# Patient Record
Sex: FEMALE | Race: WHITE | NOT HISPANIC OR LATINO
[De-identification: names, ages, dates, MRNs, and addresses within clinical notes are randomized per-mention and may not be internally consistent; named-entity substitution may affect disease eponyms.]

---

## 2017-12-01 ENCOUNTER — FORM ENCOUNTER (OUTPATIENT)
Age: 57
End: 2017-12-01

## 2017-12-02 ENCOUNTER — OUTPATIENT (OUTPATIENT)
Dept: OUTPATIENT SERVICES | Facility: HOSPITAL | Age: 57
LOS: 1 days | End: 2017-12-02
Payer: SUBSIDIZED

## 2017-12-02 ENCOUNTER — APPOINTMENT (OUTPATIENT)
Dept: CARDIOLOGY | Facility: CLINIC | Age: 57
End: 2017-12-02

## 2017-12-02 ENCOUNTER — APPOINTMENT (OUTPATIENT)
Dept: CV DIAGNOSITCS | Facility: HOSPITAL | Age: 57
End: 2017-12-02

## 2017-12-02 DIAGNOSIS — Z00.6 ENCOUNTER FOR EXAMINATION FOR NORMAL COMPARISON AND CONTROL IN CLINICAL RESEARCH PROGRAM: ICD-10-CM

## 2017-12-02 PROCEDURE — 75571 CT HRT W/O DYE W/CA TEST: CPT | Mod: 26

## 2017-12-02 PROCEDURE — 75571 CT HRT W/O DYE W/CA TEST: CPT

## 2017-12-02 PROCEDURE — 93010 ELECTROCARDIOGRAM REPORT: CPT

## 2017-12-02 PROCEDURE — 93306 TTE W/DOPPLER COMPLETE: CPT | Mod: 26

## 2017-12-02 PROCEDURE — 93306 TTE W/DOPPLER COMPLETE: CPT

## 2017-12-02 PROCEDURE — 93005 ELECTROCARDIOGRAM TRACING: CPT

## 2017-12-07 ENCOUNTER — RESULT CHARGE (OUTPATIENT)
Age: 57
End: 2017-12-07

## 2017-12-08 ENCOUNTER — OTHER (OUTPATIENT)
Age: 57
End: 2017-12-08

## 2017-12-08 DIAGNOSIS — Z00.00 ENCOUNTER FOR GENERAL ADULT MEDICAL EXAMINATION W/OUT ABNORMAL FINDINGS: ICD-10-CM

## 2018-09-09 ENCOUNTER — EMERGENCY (EMERGENCY)
Facility: HOSPITAL | Age: 58
LOS: 1 days | Discharge: HOME | End: 2018-09-09
Attending: EMERGENCY MEDICINE
Payer: COMMERCIAL

## 2018-09-09 VITALS
SYSTOLIC BLOOD PRESSURE: 124 MMHG | DIASTOLIC BLOOD PRESSURE: 65 MMHG | OXYGEN SATURATION: 98 % | TEMPERATURE: 98 F | HEART RATE: 65 BPM | RESPIRATION RATE: 18 BRPM

## 2018-09-09 VITALS
DIASTOLIC BLOOD PRESSURE: 72 MMHG | TEMPERATURE: 98 F | RESPIRATION RATE: 16 BRPM | SYSTOLIC BLOOD PRESSURE: 146 MMHG | OXYGEN SATURATION: 99 % | HEART RATE: 66 BPM

## 2018-09-09 PROCEDURE — 99284 EMERGENCY DEPT VISIT MOD MDM: CPT

## 2018-09-09 PROCEDURE — 72125 CT NECK SPINE W/O DYE: CPT

## 2018-09-09 PROCEDURE — 72125 CT NECK SPINE W/O DYE: CPT | Mod: 26

## 2018-09-09 PROCEDURE — 99284 EMERGENCY DEPT VISIT MOD MDM: CPT | Mod: 25

## 2018-09-09 PROCEDURE — 70450 CT HEAD/BRAIN W/O DYE: CPT

## 2018-09-09 PROCEDURE — 70450 CT HEAD/BRAIN W/O DYE: CPT | Mod: 26

## 2018-09-09 RX ORDER — IBUPROFEN 200 MG
600 TABLET ORAL ONCE
Qty: 0 | Refills: 0 | Status: COMPLETED | OUTPATIENT
Start: 2018-09-09 | End: 2018-09-09

## 2018-09-09 RX ORDER — ACETAMINOPHEN 500 MG
975 TABLET ORAL ONCE
Qty: 0 | Refills: 0 | Status: COMPLETED | OUTPATIENT
Start: 2018-09-09 | End: 2018-09-09

## 2018-09-09 RX ADMIN — Medication 600 MILLIGRAM(S): at 21:12

## 2018-09-09 RX ADMIN — Medication 975 MILLIGRAM(S): at 19:53

## 2018-09-09 NOTE — ED PROVIDER NOTE - MUSCULOSKELETAL, MLM
Spine appears normal, range of motion is not limited, no muscle or joint tenderness **ATTENDING ADDENDUM (Dr. Jorge Fields): POSITIVE left upper extremity with known and chronic lymphedema from previous breast cancer-associated surgical procedures.

## 2018-09-09 NOTE — ED PROVIDER NOTE - PROGRESS NOTE DETAILS
**ATTENDING ADDENDUM (Dr. Jorge Fields): patient serially evaluated throughout ED course. NO acute deterioration up to this time in the ED. ED diagnostics up to this time acknowledged, reviewed and noted. Awaiting readings of CT images. Reevaluated. Still with modest paraspinal neck pain, occipital headache, and shoulder stiffness. NO evidence of fracture or disolocation. Agree with NSAIDs (confirmed appropriateness of this medication with patient prior to administration). Will continue to observe and monitor closely. C- collar cleared, no cervical mid tender, full ROMs, NAD in CT. Neuro- intact. C- collar cleared, no cervical mid tender, full ROMs, NAD in CT. Neuro- intact.  **ATTENDING ADDENDUM (Dr. Jorge Fields): patient serially evaluated throughout ED course. NO acute deterioration up to this time in the ED. ED diagnostics up to this time acknowledged, reviewed and noted. NO evidence of worrisome cervical spine or cord injury, intracerebral hemorrhage, or equivalent traumatic injuries warranting admission and inpatient observation. Agree with discharge home with close outpatient followup with primary care physician/provider and with medications (if appropriate, if clinically indicated, and as prescribed, as noted in EMR). Anticipatory guidance provided.

## 2018-09-09 NOTE — ED PROVIDER NOTE - GASTROINTESTINAL NEGATIVE STATEMENT, MLM
no abdominal pain, no bloating, no constipation, no diarrhea, no nausea and no vomiting. no abdominal pain, no bloating, no constipation, no diarrhea, no nausea and no vomiting. **ATTENDING ADDENDUM (Dr. Jorge Fields): history of gastric bypass surgery
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
## 2018-09-09 NOTE — ED PROVIDER NOTE - ATTENDING CONTRIBUTION TO CARE
**ATTENDING ADDENDUM (Dr. Jorge Fields): I attest that I have directly examined this patient and reviewed and formulated the diagnostic and therapeutic management plan in collaboration with the advanced practitioner (NP, PA). Please see MDM note and remainder of EMR for findings from CC, HPI, ROS, and PE. (Filiberto) **ATTENDING ADDENDUM (Dr. Jorge Fields): I attest that I have directly examined this patient and reviewed and formulated the diagnostic and therapeutic management plan in collaboration with the advanced practitioner (NP, PA). Please see MDM note and remainder of EMR for findings from CC, HPI, ROS, and PE. (Maggie/Filiberto)

## 2018-09-09 NOTE — ED PROVIDER NOTE - EYES, MLM
Clear bilaterally, pupils equal, round and reactive to light. **ATTENDING ADDENDUM (Dr. Jorge Fields): Extraocular muscle movements intact. Clear corneas bilaterally, pupils equal and round. NO nystagmus.
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
## 2018-09-09 NOTE — ED PROVIDER NOTE - MUSCULOSKELETAL [+], MLM
NECK PAIN NECK PAIN/**ATTENDING ADDENDUM (Dr. Jorge Fields): chronic lymphedema of the left upper extremity
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
## 2018-09-09 NOTE — ED PROVIDER NOTE - GASTROINTESTINAL, MLM
Abdomen soft, non-tender, no guarding. Abdomen soft, non-tender **ATTENDING ADDENDUM (Dr. Jorge Fields): non-distended. NO guarding, rebound, or rigidity. NO pulsatile or non-pulsatile masses. NO hernias. NO obvious hepatosplenomegaly.

## 2018-09-09 NOTE — ED PROVIDER NOTE - CARE PLAN
Goal:	ATTENDING ADDENDUM (Dr. Jorge Fields): Goals of care include resolution of emergent/urgent symptoms and concerns, and restoration to baseline level of homeostasis. Principal Discharge DX:	Cervical strain, acute, initial encounter  Goal:	ATTENDING ADDENDUM (Dr. Jorge Fields): Goals of care include resolution of emergent/urgent symptoms and concerns, and restoration to baseline level of homeostasis.  Secondary Diagnosis:	Head injury Principal Discharge DX:	Cervical strain, acute, initial encounter  Goal:	ATTENDING ADDENDUM (Dr. Jorge Fields): Goals of care include resolution of emergent/urgent symptoms and concerns, and restoration to baseline level of homeostasis.  Assessment and plan of treatment:	ATTENDING ADDENDUM (Dr. Jorge Fields): (1) anticipatory guidance provided  (2) rest  (3) outpatient follow-up with your primary care physician/provider (4) return if symptoms worsen, persist, or do not resolve (5) medications, if indicated, as prescribed  Secondary Diagnosis:	Head injury

## 2018-09-09 NOTE — ED ADULT NURSE NOTE - NSIMPLEMENTINTERV_GEN_ALL_ED
Implemented All Universal Safety Interventions:  [place] to call system. Call bell, personal items and telephone within reach. Instruct patient to call for assistance. Room bathroom lighting operational. Non-slip footwear when patient is off stretcher. Physically safe environment: no spills, clutter or unnecessary equipment. Stretcher in lowest position, wheels locked, appropriate side rails in place.

## 2018-09-09 NOTE — ED PROVIDER NOTE - PSH
Breast Augmentation    H/O: Hysterectomy    History of Abdominoplasty    History of Bariatric Surgery    S/P Bilateral Mastectomy    S/P Cholecystectomy    S/P Tonsillectomy

## 2018-09-09 NOTE — ED PROVIDER NOTE - ENMT, MLM
Airway patent, Nasal mucosa clear. Mouth with normal mucosa. Throat has no vesicles, no oropharyngeal exudates and uvula is midline. **ATTENDING ADDENDUM (Dr. Jorge Fields): AIRWAY CLEAR. NO pooling of secretions, POSITIVE gag reflex, NO debris, ABLE TO SELF-PROTECT AIRWAY.

## 2018-09-09 NOTE — ED PROVIDER NOTE - PLAN OF CARE
ATTENDING ADDENDUM (Dr. Jorge Fields): Goals of care include resolution of emergent/urgent symptoms and concerns, and restoration to baseline level of homeostasis. ATTENDING ADDENDUM (Dr. Jorge Fields): (1) anticipatory guidance provided  (2) rest  (3) outpatient follow-up with your primary care physician/provider (4) return if symptoms worsen, persist, or do not resolve (5) medications, if indicated, as prescribed

## 2018-09-09 NOTE — ED PROVIDER NOTE - SKIN NEGATIVE STATEMENT, MLM
no abrasions, no jaundice, no lesions, no pruritis, and no rashes. no abrasions, no jaundice, no lesions, no pruritis, and no rashes. **ATTENDING ADDENDUM (Dr. Jorge Fields): POSITIVE history of breast cancer s/p bilateral mastectomy and other related procedures.

## 2018-09-09 NOTE — ED PROVIDER NOTE - NEUROLOGICAL, MLM
Alert and oriented, no focal deficits, no motor or sensory deficits. **ATTENDING ADDENDUM (Dr. Jorge Fields): Mamadou coma score = 15 (eyes =4, verbal =5, motor =6). NO posturing NO focal motor weakness NO sensory changes. Awake alert coherent interactive and oriented to person, place, and time.
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
## 2018-10-10 ENCOUNTER — APPOINTMENT (OUTPATIENT)
Dept: OTOLARYNGOLOGY | Facility: CLINIC | Age: 58
End: 2018-10-10
Payer: COMMERCIAL

## 2018-10-10 VITALS
WEIGHT: 242 LBS | BODY MASS INDEX: 44.53 KG/M2 | HEIGHT: 62 IN | HEART RATE: 68 BPM | DIASTOLIC BLOOD PRESSURE: 71 MMHG | SYSTOLIC BLOOD PRESSURE: 126 MMHG

## 2018-10-10 DIAGNOSIS — I89.0 LYMPHEDEMA, NOT ELSEWHERE CLASSIFIED: ICD-10-CM

## 2018-10-10 PROCEDURE — 99204 OFFICE O/P NEW MOD 45 MIN: CPT | Mod: 25

## 2018-10-10 PROCEDURE — 92567 TYMPANOMETRY: CPT

## 2018-10-10 PROCEDURE — G0268 REMOVAL OF IMPACTED WAX MD: CPT

## 2018-10-10 PROCEDURE — 92557 COMPREHENSIVE HEARING TEST: CPT

## 2018-10-10 RX ORDER — METFORMIN HYDROCHLORIDE 1000 MG/1
1000 TABLET, COATED ORAL
Refills: 0 | Status: ACTIVE | COMMUNITY

## 2018-10-10 RX ORDER — SITAGLIPTIN 100 MG/1
100 TABLET, FILM COATED ORAL
Refills: 0 | Status: ACTIVE | COMMUNITY

## 2018-12-11 NOTE — ED ADULT NURSE NOTE - PSH
done
Breast Augmentation    H/O: Hysterectomy    History of Abdominoplasty    History of Bariatric Surgery    S/P Bilateral Mastectomy    S/P Cholecystectomy    S/P Tonsillectomy

## 2018-12-12 ENCOUNTER — APPOINTMENT (OUTPATIENT)
Dept: OTOLARYNGOLOGY | Facility: CLINIC | Age: 58
End: 2018-12-12
Payer: COMMERCIAL

## 2018-12-12 VITALS
WEIGHT: 236 LBS | DIASTOLIC BLOOD PRESSURE: 79 MMHG | HEIGHT: 62 IN | BODY MASS INDEX: 43.43 KG/M2 | HEART RATE: 65 BPM | SYSTOLIC BLOOD PRESSURE: 128 MMHG

## 2018-12-12 DIAGNOSIS — H60.391 OTHER INFECTIVE OTITIS EXTERNA, RIGHT EAR: ICD-10-CM

## 2018-12-12 PROCEDURE — 99214 OFFICE O/P EST MOD 30 MIN: CPT

## 2020-06-29 ENCOUNTER — APPOINTMENT (OUTPATIENT)
Dept: OTOLARYNGOLOGY | Facility: CLINIC | Age: 60
End: 2020-06-29
Payer: COMMERCIAL

## 2020-06-29 VITALS
WEIGHT: 243 LBS | HEIGHT: 62 IN | HEART RATE: 66 BPM | SYSTOLIC BLOOD PRESSURE: 179 MMHG | TEMPERATURE: 98.2 F | DIASTOLIC BLOOD PRESSURE: 82 MMHG | BODY MASS INDEX: 44.72 KG/M2

## 2020-06-29 DIAGNOSIS — H61.23 IMPACTED CERUMEN, BILATERAL: ICD-10-CM

## 2020-06-29 DIAGNOSIS — H93.13 TINNITUS, BILATERAL: ICD-10-CM

## 2020-06-29 DIAGNOSIS — H90.3 SENSORINEURAL HEARING LOSS, BILATERAL: ICD-10-CM

## 2020-06-29 PROCEDURE — G0268 REMOVAL OF IMPACTED WAX MD: CPT

## 2020-06-29 PROCEDURE — 92557 COMPREHENSIVE HEARING TEST: CPT

## 2020-06-29 PROCEDURE — 99214 OFFICE O/P EST MOD 30 MIN: CPT | Mod: 25

## 2020-06-29 PROCEDURE — 92567 TYMPANOMETRY: CPT

## 2020-06-29 NOTE — HISTORY OF PRESENT ILLNESS
[Tinnitus] : tinnitus [Hearing Loss] : hearing loss [de-identified] : 61 yo female\par Patient presents for routine annual visit. Complains of tinnitus L>R ear. Describes constant  as a high pitched sound then to a low tone. Has a hearing loss, no change since last visit. \par Pt has no ear pain, ear drainage, vertigo, nasal congestion, nasal discharge, epistaxis, sinus infections, facial pain, facial pressure, throat pain, dysphagia or fevers\par \par  [Anxiety] : no anxiety [Dizziness] : no dizziness [Recurrent Otitis Media] : no recurrent otitis media [Headache] : no headache [Otitis Media with Effusion] : no otitis media with effusion [Presbycusis] : no presbycusis [Congenital Ear Malformation] : no congenital ear malformation [Meniere Disease] : no Meniere disease [Otosclerosis] : no otosclerosis [Perilymphatic Fistula] : no perilymphatic fistula [Hypertension] : no hypertension [Smoking] : no smoking [Loud Noise Exposure] : no history of loud noise exposure [Early Onset Hearing Loss] : no early onset hearing loss [Stroke] : no stroke [Facial Pressure] : no facial pressure [Facial Pain] : no facial pain [Nasal Congestion] : no nasal congestion [Diplopia] : no diplopia [Allergic Rhinitis] : no allergic rhinitis [Ear Fullness] : no ear fullness [Maxillary Tooth Infection] : no maxillary tooth infection [Environmental Allergies] : no environmental allergies [Septal Deviation] : no septal deviation [Seasonal Allergies] : no seasonal allergies [Environmental Allergens] : no environmental allergens [Nasal FB Removal] : no nasal foreign body removal [Adenoidectomy] : no adenoidectomy [Allergies] : no allergies [Asthma] : no asthma [Neck Mass] : no neck mass [Chills] : no chills [Neck Pain] : no neck pain [Fatigue] : no fatigue [Cough] : no cough [Cold Intolerance] : no cold intolerance [Heat Intolerance] : no heat intolerance [Hyperthyroidism] : no hyperthyroidism [Hodgkin Disease] : no hodgkin disease [Non-Hodgkin Lymphoma] : no non-hodgkin lymphoma [Sialadenitis] : no sialadenitis [Alcohol Use] : no alcohol use [Tobacco Use] : no tobacco use [Graves Disease] : no graves disease [Thyroid Cancer] : no thyroid cancer

## 2020-06-29 NOTE — PHYSICAL EXAM
[Midline] : trachea located in midline position [Normal] : no rashes [de-identified] : left ear cerumen

## 2020-06-29 NOTE — ASSESSMENT
[FreeTextEntry1] : Wax:\par -Cerumen is removed from the left  ear canal with a curette and suction.\par -Rx:Debrox be placed in both ears on a routine basis to keep them free of wax.\par -Routine debridement suggested to keep the ears free of wax.\par \par Tinnitus \par -Hearing Test performed to evaluate the extent of hearing loss and to explain pt's symptoms-SNHL bilat \par bilateral sensorineural hearing loss-cleared for hearing aids\par \par f/u annual\par \par

## 2020-07-27 ENCOUNTER — OUTPATIENT (OUTPATIENT)
Dept: OUTPATIENT SERVICES | Facility: HOSPITAL | Age: 60
LOS: 1 days | End: 2020-07-27
Payer: COMMERCIAL

## 2020-07-27 ENCOUNTER — RESULT REVIEW (OUTPATIENT)
Age: 60
End: 2020-07-27

## 2020-07-27 ENCOUNTER — APPOINTMENT (OUTPATIENT)
Dept: MRI IMAGING | Facility: CLINIC | Age: 60
End: 2020-07-27
Payer: COMMERCIAL

## 2020-07-27 DIAGNOSIS — R51 HEADACHE: ICD-10-CM

## 2020-07-27 DIAGNOSIS — Z00.8 ENCOUNTER FOR OTHER GENERAL EXAMINATION: ICD-10-CM

## 2020-07-27 PROCEDURE — 70551 MRI BRAIN STEM W/O DYE: CPT | Mod: 26

## 2020-07-27 PROCEDURE — 70551 MRI BRAIN STEM W/O DYE: CPT

## 2020-08-07 ENCOUNTER — APPOINTMENT (OUTPATIENT)
Dept: GASTROENTEROLOGY | Facility: CLINIC | Age: 60
End: 2020-08-07
Payer: COMMERCIAL

## 2020-08-07 VITALS
DIASTOLIC BLOOD PRESSURE: 90 MMHG | TEMPERATURE: 98 F | HEART RATE: 74 BPM | SYSTOLIC BLOOD PRESSURE: 150 MMHG | OXYGEN SATURATION: 98 % | WEIGHT: 246 LBS | BODY MASS INDEX: 45.27 KG/M2 | HEIGHT: 62 IN

## 2020-08-07 DIAGNOSIS — I82.90 ACUTE EMBOLISM AND THROMBOSIS OF UNSPECIFIED VEIN: ICD-10-CM

## 2020-08-07 DIAGNOSIS — C50.919 MALIGNANT NEOPLASM OF UNSPECIFIED SITE OF UNSPECIFIED FEMALE BREAST: ICD-10-CM

## 2020-08-07 DIAGNOSIS — R14.0 ABDOMINAL DISTENSION (GASEOUS): ICD-10-CM

## 2020-08-07 DIAGNOSIS — Z12.11 ENCOUNTER FOR SCREENING FOR MALIGNANT NEOPLASM OF COLON: ICD-10-CM

## 2020-08-07 DIAGNOSIS — K59.09 OTHER CONSTIPATION: ICD-10-CM

## 2020-08-07 PROCEDURE — 99242 OFF/OP CONSLTJ NEW/EST SF 20: CPT

## 2020-08-07 NOTE — REASON FOR VISIT
[Initial Evaluation] : an initial evaluation [FreeTextEntry1] : Patient with abdominal distention particularly of the lower abdomen, history of gastric bypass many years ago and a recent CAT scan showing status post abdominal wall herniography, tubular shaped soft tissue density lesion seen along the right paramedian upper abdominal wall extending into the subcutaneous fat measures 3 x 2 x 2 cm request for colonoscopy and upper endoscopy

## 2020-08-07 NOTE — ASSESSMENT
[FreeTextEntry1] : Impression\par \par Lower abdominal distention after eating\par \par 3 by 2 x 2 centimeter lesion in the abdominal wall on CAT scan and ultrasound, she will see Dr. Jeff Zeng for further evaluation of this\par \par Due for screening colonoscopy\par \par History of gastric bypass surgery and would like upper endoscopy\par \par Blood clot left arm and will be starting Xarelto\par \par Lymphedema left arm\par \par Suggest,\par \par See Dr. Jeff Zeng of surgery\par \par Schedule upper endoscopy and colonoscopy with me\par \par Anesthesia clearance\par \par She would have to come off Xarelto 3 days prior to procedure if allowed\par \par Suprep\par \par The laxative, or its risks benefits and alternatives have been thoroughly reviewed with the patient in great detail. The laxative instructions have been reviewed in great detail with the patient.\par \par Risks/benefits:\par The procedure, the risks and benefits and alternatives have been reviewed in great detail with the patient.  Risks including, but not limited to sedation such as cardiac and pulmonary compromise, the procedure itself such as bleeding requiring hospitalization, transfusion, surgery, temporary or permanent colostomy.  Perforation or puncture of the requiring hospitalization, surgery, temporary colostomy.\par It has been explained to the patient that though colonoscopy is thought to be the best screening exam for colon cancer and polyps, no screening exam can find all colon polyps or cancers.  \par The patient expresses understanding of the procedure and consents to undergoing the procedure.\par \par

## 2020-08-07 NOTE — PHYSICAL EXAM
[General Appearance - Alert] : alert [General Appearance - In No Acute Distress] : in no acute distress [Sclera] : the sclera and conjunctiva were normal [Neck Appearance] : the appearance of the neck was normal [Neck Cervical Mass (___cm)] : no neck mass was observed [Jugular Venous Distention Increased] : there was no jugular-venous distention [Auscultation Breath Sounds / Voice Sounds] : lungs were clear to auscultation bilaterally [Apical Impulse] : the apical impulse was normal [Abdomen Tenderness] : non-tender [Bowel Sounds] : normal bowel sounds [Abdomen Soft] : soft [FreeTextEntry1] : Well-formed scars, no palpable mass, normal active bowel sounds, nondistended [Abdomen Mass (___ Cm)] : no abdominal mass palpated [No Spinal Tenderness] : no spinal tenderness [No CVA Tenderness] : no ~M costovertebral angle tenderness [Musculoskeletal - Swelling] : no joint swelling seen [Abnormal Walk] : normal gait [Nail Clubbing] : no clubbing  or cyanosis of the fingernails [] : no rash [Skin Color & Pigmentation] : normal skin color and pigmentation [Motor Tone] : muscle strength and tone were normal [Skin Turgor] : normal skin turgor [No Focal Deficits] : no focal deficits [Oriented To Time, Place, And Person] : oriented to person, place, and time [Impaired Insight] : insight and judgment were intact [Affect] : the affect was normal

## 2020-08-07 NOTE — CONSULT LETTER
[Please see my note below.] : Please see my note below. [Dear  ___] : Dear  [unfilled], [Consult Letter:] : I had the pleasure of evaluating your patient, [unfilled]. [Sincerely,] : Sincerely, [Consult Closing:] : Thank you very much for allowing me to participate in the care of this patient.  If you have any questions, please do not hesitate to contact me. [FreeTextEntry2] : Dr. Geo Fraser\par \par Family medicine\par \par [address], [place] [address]\par \par [phone] [FreeTextEntry3] : Frederic Brumfield MD\par

## 2020-08-07 NOTE — HISTORY OF PRESENT ILLNESS
[de-identified] : Dr. Geo Fraser\par \par Family medicine\par \par [address] [facility], [place] [address]\par \par [phone]\par \par Pleasant 60-year-old female with history of Silvio-en-Y gastrectomy for bariatric surgery in 2004 with hernia repair at that time\par \par She has been having good success with weight loss, she is maintained excessive amount of weight loss of about 160 pounds\par \par She has been having some abdominal distention in the lower abdomen particularly after meals\par \par No nausea or vomiting\par \par She has some mild constipation but without blood or thinning of stools\par \par She is due for screening colonoscopy and would also like upper endoscopy\par \par She is recent CAT scan of abdomen and ultrasound of the abdomen where there is mention of a 3 by 2 x 2 centimeter subcutaneous tubular shaped soft density in the right paramedian upper abdominal wall extending into subcutaneous fat\par \par She will see Dr. Jeff Zeng for further evaluation and possible needle biopsy of this\par \par She has a blood clot in her left arm and is going on Xarelto\par \par She has lymphedema of the left arm post breast cancer surgery\par \par

## 2020-09-03 ENCOUNTER — OUTPATIENT (OUTPATIENT)
Dept: OUTPATIENT SERVICES | Facility: HOSPITAL | Age: 60
LOS: 1 days | Discharge: HOME | End: 2020-09-03

## 2020-09-04 DIAGNOSIS — F33.2 MAJOR DEPRESSIVE DISORDER, RECURRENT SEVERE WITHOUT PSYCHOTIC FEATURES: ICD-10-CM

## 2020-09-28 ENCOUNTER — APPOINTMENT (OUTPATIENT)
Dept: GASTROENTEROLOGY | Facility: AMBULATORY MEDICAL SERVICES | Age: 60
End: 2020-09-28

## 2020-12-23 PROBLEM — Z12.11 ENCOUNTER FOR SCREENING COLONOSCOPY: Status: RESOLVED | Noted: 2020-08-07 | Resolved: 2020-12-23

## 2021-01-18 ENCOUNTER — NON-APPOINTMENT (OUTPATIENT)
Age: 61
End: 2021-01-18

## 2021-01-29 ENCOUNTER — APPOINTMENT (OUTPATIENT)
Dept: CARDIOLOGY | Facility: CLINIC | Age: 61
End: 2021-01-29
Payer: COMMERCIAL

## 2021-01-29 VITALS
DIASTOLIC BLOOD PRESSURE: 83 MMHG | OXYGEN SATURATION: 100 % | HEIGHT: 62 IN | HEART RATE: 73 BPM | SYSTOLIC BLOOD PRESSURE: 136 MMHG

## 2021-01-29 PROCEDURE — 99204 OFFICE O/P NEW MOD 45 MIN: CPT

## 2021-01-29 PROCEDURE — 99072 ADDL SUPL MATRL&STAF TM PHE: CPT

## 2021-01-29 RX ORDER — ALPRAZOLAM 0.5 MG/1
0.5 TABLET ORAL
Refills: 0 | Status: DISCONTINUED | COMMUNITY
End: 2021-01-29

## 2021-01-29 RX ORDER — RIVAROXABAN 20 MG/1
20 TABLET, FILM COATED ORAL
Refills: 0 | Status: DISCONTINUED | COMMUNITY
End: 2021-01-29

## 2021-01-29 RX ORDER — OFLOXACIN OTIC 3 MG/ML
0.3 SOLUTION AURICULAR (OTIC) TWICE DAILY
Qty: 1 | Refills: 0 | Status: DISCONTINUED | COMMUNITY
Start: 2018-12-12 | End: 2021-01-29

## 2021-01-29 NOTE — PHYSICAL EXAM
[General Appearance - Well Developed] : well developed [General Appearance - Well Nourished] : well nourished [General Appearance - In No Acute Distress] : no acute distress [Normal Conjunctiva] : the conjunctiva exhibited no abnormalities [Normal Oral Mucosa] : normal oral mucosa [Heart Rate And Rhythm] : heart rate and rhythm were normal [Heart Sounds] : normal S1 and S2 [Arterial Pulses Normal] : the arterial pulses were normal [Auscultation Breath Sounds / Voice Sounds] : lungs were clear to auscultation bilaterally [Abdomen Soft] : soft [Abnormal Walk] : normal gait [Nail Clubbing] : no clubbing of the fingernails [Cyanosis, Localized] : no localized cyanosis [] : no rash [Oriented To Time, Place, And Person] : oriented to person, place, and time [Mood] : the mood was normal [FreeTextEntry1] : Left upper extremity lymphedema, soft

## 2021-01-29 NOTE — ASSESSMENT
[FreeTextEntry1] : 61 yo F with history of breast cancer s/p double mastectomy with lymph node dissection in 2008 c/b L lymphedema in 2010, and obesity s/p history of gastric bypass 2006 who presents for evaluation of L lymphedema.\par - Lymphedema well controlled, continue with physical therapy, compression with ACE wraps\par - Will obtain LUE venous duplex to evaluate for DVT, evaluate venous flow\par - If edema uncontrolled in the future,  then can consider referral to [facility] for LN transplant

## 2021-01-29 NOTE — HISTORY OF PRESENT ILLNESS
[FreeTextEntry1] : Ms. Croona is a 61 yo F with history of breast cancer s/p double mastectomy with lymph node dissection in 2008 c/b L lymphedema in 2010, and obesity s/p history of gastric bypass 2006 who presents for evaluation of L lymphedema.\par Had history of multiple LUE venous angioplasty, last in 3/2020 with Dr. Schultz at [facility] with improvement in swelling, has since followed up with PT for compression. Here for evaluation due to residual swelling despite all therapies above. Last had cellulitis 10/2021. Sees PT three times a week, elevates daily. Was on Xarelto for angioplasty, now stopped.

## 2021-02-09 ENCOUNTER — OUTPATIENT (OUTPATIENT)
Dept: OUTPATIENT SERVICES | Facility: HOSPITAL | Age: 61
LOS: 1 days | End: 2021-02-09
Payer: COMMERCIAL

## 2021-02-09 ENCOUNTER — APPOINTMENT (OUTPATIENT)
Dept: ULTRASOUND IMAGING | Facility: CLINIC | Age: 61
End: 2021-02-09
Payer: COMMERCIAL

## 2021-02-09 DIAGNOSIS — I89.0 LYMPHEDEMA, NOT ELSEWHERE CLASSIFIED: ICD-10-CM

## 2021-02-09 PROCEDURE — 93971 EXTREMITY STUDY: CPT | Mod: 26

## 2021-02-09 PROCEDURE — 93971 EXTREMITY STUDY: CPT

## 2021-02-23 ENCOUNTER — TRANSCRIPTION ENCOUNTER (OUTPATIENT)
Age: 61
End: 2021-02-23

## 2021-04-06 ENCOUNTER — APPOINTMENT (OUTPATIENT)
Dept: GASTROENTEROLOGY | Facility: AMBULATORY MEDICAL SERVICES | Age: 61
End: 2021-04-06

## 2021-04-13 ENCOUNTER — APPOINTMENT (OUTPATIENT)
Dept: ORTHOPEDIC SURGERY | Facility: CLINIC | Age: 61
End: 2021-04-13
Payer: COMMERCIAL

## 2021-04-13 VITALS
HEART RATE: 65 BPM | OXYGEN SATURATION: 97 % | DIASTOLIC BLOOD PRESSURE: 86 MMHG | BODY MASS INDEX: 43.69 KG/M2 | WEIGHT: 243.5 LBS | SYSTOLIC BLOOD PRESSURE: 154 MMHG | HEIGHT: 62.5 IN

## 2021-04-13 DIAGNOSIS — Z87.898 PERSONAL HISTORY OF OTHER SPECIFIED CONDITIONS: ICD-10-CM

## 2021-04-13 DIAGNOSIS — M25.562 PAIN IN RIGHT KNEE: ICD-10-CM

## 2021-04-13 DIAGNOSIS — Z86.39 PERSONAL HISTORY OF OTHER ENDOCRINE, NUTRITIONAL AND METABOLIC DISEASE: ICD-10-CM

## 2021-04-13 DIAGNOSIS — M25.561 PAIN IN RIGHT KNEE: ICD-10-CM

## 2021-04-13 DIAGNOSIS — Z78.9 OTHER SPECIFIED HEALTH STATUS: ICD-10-CM

## 2021-04-13 PROCEDURE — 99072 ADDL SUPL MATRL&STAF TM PHE: CPT

## 2021-04-13 PROCEDURE — 99204 OFFICE O/P NEW MOD 45 MIN: CPT

## 2021-04-13 PROCEDURE — 99214 OFFICE O/P EST MOD 30 MIN: CPT

## 2021-04-13 PROCEDURE — 73564 X-RAY EXAM KNEE 4 OR MORE: CPT | Mod: RT

## 2021-04-13 RX ORDER — CITALOPRAM HYDROBROMIDE 20 MG/1
20 TABLET, FILM COATED ORAL
Refills: 0 | Status: DISCONTINUED | COMMUNITY
End: 2021-04-13

## 2021-04-13 RX ORDER — CEFADROXIL 500 MG/1
500 CAPSULE ORAL
Refills: 0 | Status: DISCONTINUED | COMMUNITY
End: 2021-04-13

## 2021-04-13 NOTE — PHYSICAL EXAM
[de-identified] : Physical examination discloses a 60-year-old femal , increased BMI,ambulating with an antalgic gait using a unilateral cane.  Severe tenderness noted to attempted passive motion of the right knee from 0 to 100 degrees.  Similar but lesser pain level is noted to the left side [de-identified] : X-rays taken of both knees and AP lateral skyline and open notch views disclose medial compartment bone-on-bone end-stage arthritis to the right knee and near bone-on-bone severe arthritis to the left knee

## 2021-04-13 NOTE — HISTORY OF PRESENT ILLNESS
[Worsening] : worsening [___ yrs] : [unfilled] year(s) ago [6] : a minimum pain level of 6/10 [7] : a maximum pain level of 7/10 [Standing] : standing [Constant] : ~He/She~ states the symptoms seem to be constant [Walking] : worsened by walking [Knee Flexion] : worsened with knee flexion [Rest] : relieved by rest [de-identified] : Pt presents for initial evaluation with pain in her bilateral knees worse to the right, pt states she tripped down stairs 1996 when the pain began. Pt has hx of lymphedema to the left upper extremity, status post left breast surgery with axillary clearance.  Hx gel injections x2 right knee lastly 21/2 years ago. Hx meniscal tear left knee, hx physical therapy, not receiving therapy currently. Gastric bypass 2003, mastectomy bilateral 2005 . Pt weight is 243lbs. [de-identified] : certain movements," staying still for too long" [de-identified] : medication Aleve, Tylenol Arthritis

## 2021-04-13 NOTE — DISCUSSION/SUMMARY
[de-identified] : The patient was advised of her findings and shown her x-rays.  She is clearly a candidate for total knee arthroplasty given her clinical history and physical findings as well as her radiographic findings of bone-on-bone arthritis especially to the right knee.  The patient states she has an increased risk of staph infections due to her pre-existing lymphedema and has had multiple infections throughout the last several months.  She was offered referral to physical therapy and/or other NSAID medications as well as possible repeat Visco supplements, all of which she declined.  She is only interested in proceeding with an application for a [place] parking permit.  Lastly, weight reduction strategies were discussed and the patient was advised on seeking follow-up care with her bariatric specialist.  Follow-up as needed

## 2021-06-01 ENCOUNTER — APPOINTMENT (OUTPATIENT)
Dept: GASTROENTEROLOGY | Facility: AMBULATORY MEDICAL SERVICES | Age: 61
End: 2021-06-01

## 2021-06-01 ENCOUNTER — APPOINTMENT (OUTPATIENT)
Dept: GASTROENTEROLOGY | Facility: AMBULATORY MEDICAL SERVICES | Age: 61
End: 2021-06-01
Payer: COMMERCIAL

## 2021-06-01 PROCEDURE — 45378 DIAGNOSTIC COLONOSCOPY: CPT

## 2021-06-01 PROCEDURE — 43239 EGD BIOPSY SINGLE/MULTIPLE: CPT

## 2021-06-16 ENCOUNTER — NON-APPOINTMENT (OUTPATIENT)
Age: 61
End: 2021-06-16

## 2022-01-20 ENCOUNTER — APPOINTMENT (OUTPATIENT)
Dept: NEUROLOGY | Facility: CLINIC | Age: 62
End: 2022-01-20

## 2022-06-21 ENCOUNTER — APPOINTMENT (OUTPATIENT)
Dept: ORTHOPEDIC SURGERY | Facility: CLINIC | Age: 62
End: 2022-06-21
Payer: COMMERCIAL

## 2022-06-21 VITALS — BODY MASS INDEX: 44.72 KG/M2 | WEIGHT: 243 LBS | HEIGHT: 62 IN

## 2022-06-21 PROCEDURE — 73564 X-RAY EXAM KNEE 4 OR MORE: CPT | Mod: RT

## 2022-06-21 PROCEDURE — 99204 OFFICE O/P NEW MOD 45 MIN: CPT

## 2022-06-21 PROCEDURE — 73030 X-RAY EXAM OF SHOULDER: CPT | Mod: LT

## 2022-06-21 NOTE — PHYSICAL EXAM
[3___] : external rotation 3[unfilled]/5 [Left] : left shoulder [Degenerative change] : Degenerative change [Right] : right knee [All Views] : anteroposterior, lateral, skyline, and anteroposterior standing [advanced tricompartmental OA with medial compartment narrowing and varus alignment] : advanced tricompartmental OA with medial compartment narrowing and varus alignment [FreeTextEntry3] : lymphedema [FreeTextEntry1] : adv GH [] : no ecchymosis [TWNoteComboBox7] : flexion 130 degrees

## 2022-06-21 NOTE — HISTORY OF PRESENT ILLNESS
[Gradual] : gradual [7] : 7 [2] : 2 [Dull/Aching] : dull/aching [Sharp] : sharp [Constant] : constant [Household chores] : household chores [Leisure] : leisure [Nothing helps with pain getting better] : Nothing helps with pain getting better [de-identified] : she has had knee xrays for 20 years and L shoulder for 10 years, has had HA R knee about 4 years ago, she had breast cancer and now cancer free and had double mastectomy and developed Lymphedema afterwards had treatment but not recently, uses OTCs with minimal help [] : Post Surgical Visit: no [FreeTextEntry1] : right knee, left shoulder [FreeTextEntry5] : pt has lymphodema in the left shoulder with pain and swelling; pt fell and hurt the knee [FreeTextEntry7] : left arm [de-identified] : motion/pressure Cervical disc displacement    Lumbar disc disease  3 bulging disc an d 2 herniated disc  Renal stone Cervical disc displacement    Diaphragmatic hernia    GERD (gastroesophageal reflux disease)    IBS (irritable bowel syndrome)    Lumbar disc disease  3 bulging disc an d 2 herniated disc  Radiculopathy    Renal stone

## 2022-06-21 NOTE — DISCUSSION/SUMMARY
[de-identified] : Patient allowed to gently start resuming activities.\par Discussed change to medication prescription and usage. \par Offered cortisone steroid injection. \par Bracing options discussed with patient. \par Hyaluronic Acid inj pamphlet given to pt.\par she can not have injection L shoulder, has enough oA for l TSA but due to lymphedema can not have this, may need R TKA in future\par Name of Insurance\par Insurance Address:\par \par 06/21/2022 \par \par  \par \par RE:  MARY RANDOLPH \par \par Acct #- [id number] \par \par \par Attention:  Nurse Reviewer /Medical Director\par \par I am writing this letter as a medical necessity for HA orthovisc R knee\par Patient has tried analgesics, non-steroid anti-inflammatory agents, \par physical therapy, hot or cold compresses,injections of corticosteroids, etc)  which in combination or by themselves has not worked.\par Based on my patient's condition, I strongly believe that the Hyaluronic aid injections is medically needed.\par  \par Thank you for your time and consideration.   \par  \par \par \par \par

## 2022-06-21 NOTE — REASON FOR VISIT
Problem: Patient Education: Go to Patient Education Activity  Goal: Patient/Family Education  Outcome: Progressing Towards Goal     Problem: Patient Education: Go to Patient Education Activity  Goal: Patient/Family Education  Outcome: Progressing Towards Goal     Problem: Pain  Goal: *Control of Pain  Outcome: Progressing Towards Goal [FreeTextEntry2] : pt has pain in the right shoulder and knee

## 2022-07-27 ENCOUNTER — APPOINTMENT (OUTPATIENT)
Dept: ORTHOPEDIC SURGERY | Facility: CLINIC | Age: 62
End: 2022-07-27

## 2022-07-27 ENCOUNTER — APPOINTMENT (OUTPATIENT)
Dept: VASCULAR SURGERY | Facility: CLINIC | Age: 62
End: 2022-07-27

## 2022-07-27 VITALS
DIASTOLIC BLOOD PRESSURE: 77 MMHG | BODY MASS INDEX: 41.28 KG/M2 | HEART RATE: 78 BPM | TEMPERATURE: 96.4 F | HEIGHT: 62.5 IN | SYSTOLIC BLOOD PRESSURE: 110 MMHG | WEIGHT: 230.1 LBS

## 2022-07-27 VITALS — HEIGHT: 62 IN | WEIGHT: 243 LBS | BODY MASS INDEX: 44.72 KG/M2

## 2022-07-27 DIAGNOSIS — I10 ESSENTIAL (PRIMARY) HYPERTENSION: ICD-10-CM

## 2022-07-27 PROCEDURE — 99203 OFFICE O/P NEW LOW 30 MIN: CPT

## 2022-07-27 PROCEDURE — 93971 EXTREMITY STUDY: CPT | Mod: LT

## 2022-07-27 PROCEDURE — 99214 OFFICE O/P EST MOD 30 MIN: CPT

## 2022-07-27 NOTE — PHYSICAL EXAM
[Respiratory Effort] : normal respiratory effort [Normal Rate and Rhythm] : normal rate and rhythm [2+] : left 2+ [Abdomen Tenderness] : ~T ~M No abdominal tenderness [Skin Ulcer] : no ulcer [Alert] : alert [Oriented to Person] : oriented to person [Oriented to Place] : oriented to place [Oriented to Time] : oriented to time [Calm] : calm [de-identified] : appears stated age [de-identified] : normocephalic, atraumatic [de-identified] : supple [FreeTextEntry1] : left upper extremity lymphedema present

## 2022-07-27 NOTE — HISTORY OF PRESENT ILLNESS
[Gradual] : gradual [7] : 7 [2] : 2 [Dull/Aching] : dull/aching [Sharp] : sharp [Constant] : constant [Household chores] : household chores [Leisure] : leisure [Nothing helps with pain getting better] : Nothing helps with pain getting better [de-identified] : she has had knee pain for 20 years and L shoulder for 10 years, has had HA R knee about 4 years ago, she had breast cancer and now cancer free and had double mastectomy and developed Lymphedema L arm afterwards had treatment but not recently, uses OTCs with minimal help but recently diagnosed with HTN, was in PT, [facility] denied HA [] : Post Surgical Visit: no [FreeTextEntry1] : right knee, left shoulder [FreeTextEntry7] : left arm [FreeTextEntry5] : pt has lymphodema in the left shoulder with pain and swelling; pt fell and hurt the knee [de-identified] : motion/pressure [de-identified] : physical therapy

## 2022-07-27 NOTE — DISCUSSION/SUMMARY
[de-identified] : Patient allowed to gently start resuming activities.\par Discussed change to medication prescription and usage. \par she should stop OTCS NSAIDs due to recent HTN and discuss with cardiologist\par Attention:  Nurse Reviewer /Medical Director\par \par Name of Insurance\par Insurance Address:\par \par 07/27/2022 \par \par  \par \par RE:  MARY RANDOLPH \par \par Acct #- [id number] \par \par \par Attention:  Nurse Reviewer /Medical Director\par \par I am writing this letter as a medical necessity for PT program.\par Patient has tried analgesics, non-steroid anti-inflammatory agents, \par hot or cold compresses,injections of corticosteroids, etc)  which in combination or by themselves has not worked.\par Based on my patient's condition, I strongly believe that the PT is medically needed.\par  \par Thank you for your time and consideration.   \par  \par \par \par \par \par

## 2022-07-27 NOTE — ASSESSMENT
[FreeTextEntry1] : Problem #1 LUE lymphedema\par - Continue compression and elevation\par - Continue lymphedema pump\par - Continue physical therapy\par - Follow up as needed

## 2022-07-27 NOTE — HISTORY OF PRESENT ILLNESS
[FreeTextEntry1] : 62 year old female who presents for evaluation of left upper extremity lymphedema s/p bilateral mastectomy for breast cancer. She previously underwent left upper extremity venoplasty as well. She recently started physical therapy and has seen some improvement in her left arm including her left wrist contour. She denies pain in either arm. She uses a lymphedema pump every night.

## 2022-07-27 NOTE — PHYSICAL EXAM
[Left] : left shoulder [3___] : external rotation 3[unfilled]/5 [Right] : right knee [FreeTextEntry3] : lymphedema [] : no ecchymosis [TWNoteComboBox7] : flexion 130 degrees

## 2022-08-12 ENCOUNTER — APPOINTMENT (OUTPATIENT)
Dept: ORTHOPEDIC SURGERY | Facility: CLINIC | Age: 62
End: 2022-08-12

## 2022-08-16 ENCOUNTER — APPOINTMENT (OUTPATIENT)
Dept: ORTHOPEDIC SURGERY | Facility: CLINIC | Age: 62
End: 2022-08-16

## 2022-08-16 VITALS — HEIGHT: 63 IN | BODY MASS INDEX: 40.75 KG/M2 | WEIGHT: 230 LBS

## 2022-08-16 PROCEDURE — 99213 OFFICE O/P EST LOW 20 MIN: CPT

## 2022-08-23 ENCOUNTER — APPOINTMENT (OUTPATIENT)
Dept: ORTHOPEDIC SURGERY | Facility: CLINIC | Age: 62
End: 2022-08-23

## 2022-08-23 VITALS — HEIGHT: 63 IN | WEIGHT: 230 LBS | BODY MASS INDEX: 40.75 KG/M2

## 2022-08-23 PROCEDURE — 99213 OFFICE O/P EST LOW 20 MIN: CPT

## 2022-08-25 NOTE — PROCEDURE
[FreeTextEntry3] : Viscosupplementation Injection: X-ray evidence of osteoarthritis on this or prior visit and patient has tried OTC's including aspirin, Ibuprofen, Aleve etc or prescription NSAIDS, and/or exercises at home and/ or physical therapy without satisfactory response. \par \par An injection of Visco-3 2.5ml #2 was injected into the right knee(s) after verbal consent obtained. \par \par Patient has tried OTC's including aspirin, Ibuprofen, Aleve etc or prescription NSAIDS, and/or exercises at home and/ or physical therapy without satisfactory response and Patient has decreased mobility in the joint. The risks, benefits, and alternatives to cortisone injection were explained in full to the patient. Risks outlined include but are not limited to infection, sepsis, bleeding, scarring, skin discoloration, temporary increase in pain, syncopal episode, failure to resolve symptoms, allergic reaction, and symptom recurrence. Patient understands the risks. All questions were answered. After discussion of options, patient requested an injection. Oral informed consent was obtained. Sterile technique was utilized for the procedure including the preparation of the solutions used for the injection. Injection site was prepped with betadine and alcohol. Ethyl chloride sprayed topically. Sterile technique used. Patient tolerated procedure well. \par \par Post Procedure Instructions: Patient was advised to call if redness, pain, or fever occur. Apply ice for 15 min. every 2 hours for the next 12-24 hours as tolerated. Patient was advised to rest the joint(s) for 1-2 days.\par

## 2022-08-25 NOTE — DISCUSSION/SUMMARY
[de-identified] : The risks, benefits, and alternatives to viscosupplementation injection were reviewed with the patient. Risks outlined include but are not limited to infection, sepsis, bleeding, scarring, temporary increase in pain, syncopal episode, failure to resolve symptoms, symptoms recurrence, allergic reaction, flare reaction, pseudoseptic reaction. Patient understood the risks and asked to proceed with this treatment course.\par

## 2022-08-25 NOTE — HISTORY OF PRESENT ILLNESS
[de-identified] : Visco-3 #2- right knee. Symptoms continue with some relief. Denies complication with prior injection.

## 2022-08-25 NOTE — HISTORY OF PRESENT ILLNESS
[de-identified] : 61 y/o female (patient of Dr. Mckeon) presents for visco-3 injection of the right knee. Notes chronic knee OA and has had relief with visco in the past.

## 2022-08-30 ENCOUNTER — APPOINTMENT (OUTPATIENT)
Dept: ORTHOPEDIC SURGERY | Facility: CLINIC | Age: 62
End: 2022-08-30

## 2022-08-30 VITALS — BODY MASS INDEX: 40.75 KG/M2 | WEIGHT: 230 LBS | HEIGHT: 63 IN

## 2022-08-30 PROCEDURE — 99213 OFFICE O/P EST LOW 20 MIN: CPT

## 2022-09-07 NOTE — PROCEDURE
[FreeTextEntry3] : Viscosupplementation Injection: X-ray evidence of osteoarthritis on this or prior visit and patient has tried OTC's including aspirin, Ibuprofen, Aleve etc or prescription NSAIDS, and/or exercises at home and/ or physical therapy without satisfactory response. \par \par An injection of Visco-3 2.5ml #3 was injected into the right knee(s) after verbal consent obtained. \par \par Patient has tried OTC's including aspirin, Ibuprofen, Aleve etc or prescription NSAIDS, and/or exercises at home and/ or physical therapy without satisfactory response and Patient has decreased mobility in the joint. The risks, benefits, and alternatives to cortisone injection were explained in full to the patient. Risks outlined include but are not limited to infection, sepsis, bleeding, scarring, skin discoloration, temporary increase in pain, syncopal episode, failure to resolve symptoms, allergic reaction, and symptom recurrence. Patient understands the risks. All questions were answered. After discussion of options, patient requested an injection. Oral informed consent was obtained. Sterile technique was utilized for the procedure including the preparation of the solutions used for the injection. Injection site was prepped with betadine and alcohol. Ethyl chloride sprayed topically. Sterile technique used. Patient tolerated procedure well. \par \par Post Procedure Instructions: Patient was advised to call if redness, pain, or fever occur. Apply ice for 15 min. every 2 hours for the next 12-24 hours as tolerated. Patient was advised to rest the joint(s) for 1-2 days.\par

## 2022-09-07 NOTE — HISTORY OF PRESENT ILLNESS
[de-identified] : Visco-3 #3- right knee. Symptoms continue with some relief. Denies complication with prior injection.

## 2022-09-21 ENCOUNTER — APPOINTMENT (OUTPATIENT)
Dept: ORTHOPEDIC SURGERY | Facility: CLINIC | Age: 62
End: 2022-09-21

## 2022-09-21 VITALS — HEIGHT: 62 IN | WEIGHT: 228.2 LBS | BODY MASS INDEX: 41.99 KG/M2

## 2022-09-21 PROCEDURE — 99214 OFFICE O/P EST MOD 30 MIN: CPT

## 2022-09-21 RX ORDER — IRBESARTAN 300 MG/1
TABLET ORAL
Refills: 0 | Status: ACTIVE | COMMUNITY

## 2022-09-21 NOTE — DISCUSSION/SUMMARY
[de-identified] : Patient allowed to gently start resuming activities.\par Discussed change to medication prescription and usage. \par she should stop OTCS NSAIDs due to recent HTN and discuss with cardiologist\par 09/21/2022 \par \par  RE:  MARY RANDOLPH \par \par Acct #- [id number] \par \par \par Attention:  Nurse Reviewer /Medical Director\par \par I am writing this letter as a medical necessity for PT program.\par Patient has tried analgesics, non-steroid anti-inflammatory agents, \par hot or cold compresses,injections of corticosteroids, etc)  which in combination or by themselves has not worked.\par Based on my patient's condition, I strongly believe that the PT is medically needed.\par  \par Thank you for your time and consideration.   \par she understands poss risk of injections left arm due to lymph node excision but wants to proceed\par \par

## 2022-09-21 NOTE — HISTORY OF PRESENT ILLNESS
[7] : 7 [de-identified] : she has had knee pain for 20 years and L shoulder for 10 years, has completed visco #3 with help in the knee and Left shoulder sore [Gradual] : gradual [2] : 2 [Dull/Aching] : dull/aching [Sharp] : sharp [Constant] : constant [Household chores] : household chores [Leisure] : leisure [Nothing helps with pain getting better] : Nothing helps with pain getting better [] : Post Surgical Visit: no [FreeTextEntry1] : right knee, left shoulder [FreeTextEntry5] : pt has lymphodema in the left shoulder with pain and swelling; pt fell and hurt the knee [FreeTextEntry7] : left arm [de-identified] : motion/pressure [de-identified] : physical therapy

## 2022-09-21 NOTE — PHYSICAL EXAM
[Left] : left shoulder [3___] : external rotation 3[unfilled]/5 [FreeTextEntry3] : lymphedema [Right] : right knee [] : mildly antalgic [TWNoteComboBox7] : flexion 130 degrees

## 2022-10-06 ENCOUNTER — APPOINTMENT (OUTPATIENT)
Dept: ORTHOPEDIC SURGERY | Facility: CLINIC | Age: 62
End: 2022-10-06

## 2022-10-06 VITALS — BODY MASS INDEX: 41.96 KG/M2 | WEIGHT: 228 LBS | HEIGHT: 62 IN

## 2022-10-06 PROCEDURE — 20611 DRAIN/INJ JOINT/BURSA W/US: CPT

## 2022-10-06 PROCEDURE — 99212 OFFICE O/P EST SF 10 MIN: CPT | Mod: 25

## 2022-10-06 NOTE — REVIEW OF SYSTEMS
[Joint Pain] : joint pain [Negative] : Heme/Lymph [TextBox_4] : Patient is a 52-year-old male who is a 2 1/2 pack per day smoker for over 30 years who has been complaining of a cough. It is nonproductive and occurs mostly in the evenings. It doesn't seem to keep him awake at night. He denies any increase in shortness of breath. There is some vague anterior chest discomfort and in April he saw a cardiologist who did not find anything done for ischemic heart disease. A chest x-ray was done at that time which did show some increased interstitial changes. At the time the radiologist read it as showing findings consistent with Covid 19  but the patient was not sick at the time.

## 2022-10-06 NOTE — PHYSICAL EXAM
[Left] : left shoulder [3___] : external rotation 3[unfilled]/5 [] : no ecchymosis [FreeTextEntry3] : lymphedema [TWNoteComboBox7] : active forward flexion 80 degrees

## 2022-10-06 NOTE — PROCEDURE
[FreeTextEntry3] : Visco-3 (Large Joint) with Ultrasound Guidance\par Viscosupplementation Injection: X-ray evidence of Osteoarthritis on this or prior visit and Patient has tried OTC's including aspirin, Ibuprofen, Aleve etc or prescription NSAIDS, and/or exercises at home and/ or physical therapy without satisfactory response. \par An injection of Visco-3 2ml _#_1__ was injected into the left shoulder(s). after verbal consent using sterile technique. The risks, benefits, and alternatives to Viscosupplementation injection were explained in full to the patient. Risks outlined include but are not limited to infection, sepsis, bleeding, scarring, skin discoloration, temporary increase in pain, syncopal episode, failure to resolve symptoms, allergic reaction, and symptom recurrence. Signs and symptoms of infection reviewed and patient advised to call immediately for redness, fevers, and/or chills. Patient understood the risks. All questions were answered. After discussion of options, patient requested Viscosupplementation. Oral informed consent was obtained and sterile prep was done of the injection site. Sterile technique was used without complications. The patient tolerated the procedure well. Ice tonight to the injection site. \par \par Ultrasound Guidance was used for the following reasons: altered anatomic landmarks because of erosive arthritis. \par \par Ultrasound guided injection was performed of the knee, visualization of the needle and placement of injection was performed without complication.

## 2022-10-13 ENCOUNTER — APPOINTMENT (OUTPATIENT)
Dept: ORTHOPEDIC SURGERY | Facility: CLINIC | Age: 62
End: 2022-10-13
Payer: COMMERCIAL

## 2022-10-13 VITALS — BODY MASS INDEX: 41.96 KG/M2 | HEIGHT: 62 IN | WEIGHT: 228 LBS

## 2022-10-13 PROCEDURE — 20611 DRAIN/INJ JOINT/BURSA W/US: CPT

## 2022-10-13 PROCEDURE — 99212 OFFICE O/P EST SF 10 MIN: CPT | Mod: 25

## 2022-10-13 NOTE — HISTORY OF PRESENT ILLNESS
[2] : 2 [Other:____] : [unfilled] [de-identified] : Patient returns for Visco-3 #2 left shoulder, tolerated the first injection well, no adverse reactions.  [] : no [de-identified] : left shoulder

## 2022-10-13 NOTE — PROCEDURE
[FreeTextEntry3] : Visco-3 (Large Joint) with Ultrasound Guidance\par Viscosupplementation Injection: X-ray evidence of Osteoarthritis on this or prior visit and Patient has tried OTC's including aspirin, Ibuprofen, Aleve etc or prescription NSAIDS, and/or exercises at home and/ or physical therapy without satisfactory response. \par An injection of Visco-3 2ml _#_2__ was injected into the left shoulder(s). after verbal consent using sterile technique. The risks, benefits, and alternatives to Viscosupplementation injection were explained in full to the patient. Risks outlined include but are not limited to infection, sepsis, bleeding, scarring, skin discoloration, temporary increase in pain, syncopal episode, failure to resolve symptoms, allergic reaction, and symptom recurrence. Signs and symptoms of infection reviewed and patient advised to call immediately for redness, fevers, and/or chills. Patient understood the risks. All questions were answered. After discussion of options, patient requested Viscosupplementation. Oral informed consent was obtained and sterile prep was done of the injection site. Sterile technique was used without complications. The patient tolerated the procedure well. Ice tonight to the injection site. \par \par Ultrasound Guidance was used for the following reasons: altered anatomic landmarks because of erosive arthritis. \par \par Ultrasound guided injection was performed of the knee, visualization of the needle and placement of injection was performed without complication.

## 2022-10-20 ENCOUNTER — APPOINTMENT (OUTPATIENT)
Dept: ORTHOPEDIC SURGERY | Facility: CLINIC | Age: 62
End: 2022-10-20
Payer: COMMERCIAL

## 2022-10-20 VITALS — HEIGHT: 62 IN | WEIGHT: 228 LBS | BODY MASS INDEX: 41.96 KG/M2

## 2022-10-20 DIAGNOSIS — M77.8 OTHER ENTHESOPATHIES, NOT ELSEWHERE CLASSIFIED: ICD-10-CM

## 2022-10-20 PROCEDURE — 99212 OFFICE O/P EST SF 10 MIN: CPT | Mod: 25

## 2022-10-20 PROCEDURE — 20611 DRAIN/INJ JOINT/BURSA W/US: CPT

## 2022-10-20 NOTE — PROCEDURE
[FreeTextEntry3] : Visco-3 (Large Joint) with Ultrasound Guidance\par Viscosupplementation Injection: X-ray evidence of Osteoarthritis on this or prior visit and Patient has tried OTC's including aspirin, Ibuprofen, Aleve etc or prescription NSAIDS, and/or exercises at home and/ or physical therapy without satisfactory response. \par An injection of Visco-3 2ml _#_3__ was injected into the left shoulder(s). after verbal consent using sterile technique. The risks, benefits, and alternatives to Viscosupplementation injection were explained in full to the patient. Risks outlined include but are not limited to infection, sepsis, bleeding, scarring, skin discoloration, temporary increase in pain, syncopal episode, failure to resolve symptoms, allergic reaction, and symptom recurrence. Signs and symptoms of infection reviewed and patient advised to call immediately for redness, fevers, and/or chills. Patient understood the risks. All questions were answered. After discussion of options, patient requested Viscosupplementation. Oral informed consent was obtained and sterile prep was done of the injection site. Sterile technique was used without complications. The patient tolerated the procedure well. Ice tonight to the injection site. \par \par Ultrasound Guidance was used for the following reasons: altered anatomic landmarks because of erosive arthritis. \par \par Ultrasound guided injection was performed of the knee, visualization of the needle and placement of injection was performed without complication.

## 2022-10-20 NOTE — DISCUSSION/SUMMARY
[de-identified] : rest, ice, activity modification.\par \par 10/20/2022 \par \par  RE:  MARY RANDOLPH \par \par Acct #- [id number] \par \par \par Attention:  Nurse Reviewer /Medical Director\par \par I am writing this letter as a medical necessity for PT program.\par Patient has tried analgesics, non-steroid anti-inflammatory agents, \par hot or cold compresses,injections of corticosteroids, etc)  which in combination or by themselves has not worked.\par Based on my patient's condition, I strongly believe that the PT is medically needed.\par  \par Thank you for your time and consideration.   \par

## 2022-10-20 NOTE — HISTORY OF PRESENT ILLNESS
[3] : 3 [Other:____] : [unfilled] [de-identified] : Patient returns for Visco-3 #3 left shoulder, tolerated the first injection well, no adverse reactions.  [] : no [de-identified] : 10/13/2022 [de-identified] : left shoulder

## 2022-11-17 ENCOUNTER — APPOINTMENT (OUTPATIENT)
Dept: ORTHOPEDIC SURGERY | Facility: CLINIC | Age: 62
End: 2022-11-17

## 2022-11-17 VITALS — BODY MASS INDEX: 41.96 KG/M2 | HEIGHT: 62 IN | WEIGHT: 228 LBS

## 2022-11-17 PROCEDURE — 99214 OFFICE O/P EST MOD 30 MIN: CPT

## 2022-11-17 NOTE — HISTORY OF PRESENT ILLNESS
[5] : 5 [Dull/Aching] : dull/aching [Intermittent] : intermittent [Rest] : rest [Walking] : walking [Lying in bed] : lying in bed [3] : 3 [Other:____] : [unfilled] [de-identified] : Patient completed Visco-3 #3 left shoulder Oct 20 with some help, has some clicking,R knee was done Aug 30 with some clicking, in PT [] : no [FreeTextEntry9] : Tylenol  [de-identified] : 10/13/2022 [de-identified] : left shoulder

## 2022-11-17 NOTE — PHYSICAL EXAM
[Left] : left shoulder [3___] : external rotation 3[unfilled]/5 [Right] : right knee [FreeTextEntry3] : lymphedema [] : no ecchymosis [TWNoteComboBox7] : flexion 120 degrees

## 2022-11-17 NOTE — DISCUSSION/SUMMARY
[de-identified] : rest, ice, activity modification.\par use voltaren gel\par \par 11/17//2022 \par \par  RE:  MARY RANDOLPH \par \par Acct #- [id number] \par \par \par Attention:  Nurse Reviewer /Medical Director\par \par I am writing this letter as a medical necessity for PT program.\par Patient has tried analgesics, non-steroid anti-inflammatory agents, \par hot or cold compresses,injections of corticosteroids, etc)  which in combination or by themselves has not worked.\par Based on my patient's condition, I strongly believe that the PT is medically needed.\par  \par Thank you for your time and consideration.   \par

## 2022-12-22 ENCOUNTER — APPOINTMENT (OUTPATIENT)
Dept: ORTHOPEDIC SURGERY | Facility: CLINIC | Age: 62
End: 2022-12-22
Payer: COMMERCIAL

## 2022-12-22 VITALS — WEIGHT: 228 LBS | HEIGHT: 62 IN | BODY MASS INDEX: 41.96 KG/M2

## 2022-12-22 PROCEDURE — 99214 OFFICE O/P EST MOD 30 MIN: CPT

## 2022-12-22 NOTE — PHYSICAL EXAM
[Left] : left shoulder [3___] : external rotation 3[unfilled]/5 [FreeTextEntry3] : lymphedema [TWNoteComboBox6] : internal rotation 0 degrees [Right] : right knee [] : anterior tenderness [TWNoteComboBox7] : flexion 120 degrees

## 2022-12-22 NOTE — HISTORY OF PRESENT ILLNESS
[de-identified] : Patient completed Visco-3 #3 left shoulder Oct 20 with some help, has some clicking,R knee was done Aug 30 with some clicking, in PT, has lymphedema L arm [5] : 5 [Dull/Aching] : dull/aching [Intermittent] : intermittent [Rest] : rest [Walking] : walking [Lying in bed] : lying in bed [3] : 3 [Other:____] : [unfilled] [] : yes [FreeTextEntry1] : left shoulder  [FreeTextEntry9] : Tylenol  [de-identified] : physical therapy  [de-identified] : 10/13/2022 [de-identified] : left shoulder

## 2022-12-29 ENCOUNTER — APPOINTMENT (OUTPATIENT)
Dept: ORTHOPEDIC SURGERY | Facility: CLINIC | Age: 62
End: 2022-12-29

## 2023-02-02 ENCOUNTER — APPOINTMENT (OUTPATIENT)
Dept: ORTHOPEDIC SURGERY | Facility: CLINIC | Age: 63
End: 2023-02-02
Payer: COMMERCIAL

## 2023-02-02 VITALS — BODY MASS INDEX: 39.65 KG/M2 | WEIGHT: 221 LBS | HEIGHT: 62.5 IN

## 2023-02-02 PROCEDURE — 99214 OFFICE O/P EST MOD 30 MIN: CPT

## 2023-02-02 NOTE — DISCUSSION/SUMMARY
[de-identified] : rest, ice, activity modification.\par use voltaren gel\par \par 02/02/2023 \par \par  RE:  MARY RANDOLPH \par \par Acct #- [id number] \par \par \par Attention:  Nurse Reviewer /Medical Director\par \par I am writing this letter as a medical necessity for PT program.\par Patient has tried analgesics, non-steroid anti-inflammatory agents, \par hot or cold compresses,injections of corticosteroids, etc)  which in combination or by themselves has not worked.\par Based on my patient's condition, I strongly believe that the PT is medically needed.\par  \par Thank you for your time and consideration.   \par \par can repeat HA knee March 1

## 2023-02-02 NOTE — PHYSICAL EXAM
[Left] : left shoulder [3___] : external rotation 3[unfilled]/5 [FreeTextEntry3] : lymphedema [TWNoteComboBox6] : internal rotation 0 degrees [Right] : right knee [] : mildly antalgic [TWNoteComboBox7] : flexion 120 degrees

## 2023-02-02 NOTE — HISTORY OF PRESENT ILLNESS
[Dull/Aching] : dull/aching [Sharp] : sharp [de-identified] : Patient completed Visco-3 #3 left shoulder Oct 20 with some help, has some clicking,R knee was done Aug 30 with some clicking, in PT, has lymphedema L arm, PT has helped before [5] : 5 [Intermittent] : intermittent [Rest] : rest [Walking] : walking [Lying in bed] : lying in bed [3] : 3 [Other:____] : [unfilled] [FreeTextEntry1] : left shoulder  [] : yes [FreeTextEntry9] : Tylenol  [de-identified] : physical therapy  [de-identified] : 10/13/2022 [de-identified] : left shoulder

## 2023-03-14 ENCOUNTER — APPOINTMENT (OUTPATIENT)
Dept: ORTHOPEDIC SURGERY | Facility: CLINIC | Age: 63
End: 2023-03-14
Payer: COMMERCIAL

## 2023-03-14 VITALS — HEIGHT: 62.5 IN | WEIGHT: 219 LBS | BODY MASS INDEX: 39.29 KG/M2

## 2023-03-14 PROCEDURE — 99213 OFFICE O/P EST LOW 20 MIN: CPT

## 2023-03-14 NOTE — DISCUSSION/SUMMARY
[de-identified] : modify activities\par use elastic sleeve\par try OTC meds\par ice as needed\par

## 2023-03-14 NOTE — HISTORY OF PRESENT ILLNESS
[Other:____] : [unfilled] [5] : 5 [3] : 3 [de-identified] : Patient completed Visco-3 #3 left shoulder Oct 20 with some help, has some clicking,R knee HA was done Aug 30 with some clicking, has lymphedema L arm, PT has helped before [FreeTextEntry1] : right knee  [de-identified] : resting and topical cream used as needed

## 2023-03-14 NOTE — PHYSICAL EXAM
[Left] : left shoulder [3___] : external rotation 3[unfilled]/5 [Right] : right knee [FreeTextEntry3] : lymphedema [TWNoteComboBox6] : internal rotation 0 degrees [] : no ecchymosis [5___] : quadriceps 5[unfilled]/5 [Negative] : negative Hayley's [TWNoteComboBox7] : flexion 120 degrees

## 2023-04-04 ENCOUNTER — APPOINTMENT (OUTPATIENT)
Dept: ORTHOPEDIC SURGERY | Facility: CLINIC | Age: 63
End: 2023-04-04
Payer: COMMERCIAL

## 2023-04-04 VITALS — WEIGHT: 219 LBS | BODY MASS INDEX: 39.29 KG/M2 | HEIGHT: 62.5 IN

## 2023-04-04 PROCEDURE — 20611 DRAIN/INJ JOINT/BURSA W/US: CPT

## 2023-04-04 PROCEDURE — 99213 OFFICE O/P EST LOW 20 MIN: CPT | Mod: 25

## 2023-04-04 NOTE — PROCEDURE
[Right] : of the right [Knee] : knee [Pain] : pain [Inflammation] : inflammation [X-ray evidence of Osteoarthritis on this or prior visit] : x-ray evidence of Osteoarthritis on this or prior visit [Repeat series performed] : repeat series performed [Alcohol] : alcohol [Betadine] : betadine [Ethyl Chloride sprayed topically] : ethyl chloride sprayed topically [Sterile technique used] : sterile technique used [Visco-3 (25mg)] : 25mg of Visco-3 [#1] : series #1 [Call if redness, pain or fever occur] : call if redness, pain or fever occur [Apply ice for 15min out of every hour for the next 12-24 hours as tolerated] : apply ice for 15 minutes out of every hour for the next 12-24 hours as tolerated [Patient was advised to rest the joint(s) for ____ days] : patient was advised to rest the joint(s) for [unfilled] days [Previous OTC use and PT nontherapeutic] : patient has tried OTC's including aspirin, Ibuprofen, Aleve, etc or prescription NSAIDS, and/or exercises at home and/or physical therapy without satisfactory response [Patient had decreased mobility in the joint] : patient had decreased mobility in the joint [Risks, benefits, alternatives discussed / Verbal consent obtained] : the risks benefits, and alternatives have been discussed, and verbal consent was obtained [Altered anatomic landmarks d/t erosive arthritis] : altered anatomic landmarks d/t erosive arthritis [All ultrasound images have been permanently captured and stored accordingly in our picture archiving and communication system] : All ultrasound images have been permanently captured and stored accordingly in our picture archiving and communication system

## 2023-04-04 NOTE — HISTORY OF PRESENT ILLNESS
[1] : 1 [Other:____] : [unfilled] [de-identified] : Patient completed Visco-3 #3 left shoulder Oct 20 with some help, has some clicking,R knee HA was done Aug 30 with some clicking, has lymphedema L arm, she presents for new series R knee, visco-3 [5] : 5 [3] : 3 [] : This patient has had an injection before: no [FreeTextEntry1] : right knee  [de-identified] : resting and topical cream used as needed [de-identified] : right knee

## 2023-04-04 NOTE — PHYSICAL EXAM
[Right] : right knee [5___] : quadriceps 5[unfilled]/5 [Negative] : negative Hayley's [] : mildly antalgic [TWNoteComboBox7] : flexion 120 degrees

## 2023-04-04 NOTE — DISCUSSION/SUMMARY
[de-identified] : Visco-3 injection R knee, tolerated well.\par Ice to affected area.\par Activity modification.

## 2023-04-11 ENCOUNTER — APPOINTMENT (OUTPATIENT)
Dept: ORTHOPEDIC SURGERY | Facility: CLINIC | Age: 63
End: 2023-04-11
Payer: COMMERCIAL

## 2023-04-11 PROCEDURE — 20611 DRAIN/INJ JOINT/BURSA W/US: CPT

## 2023-04-11 PROCEDURE — 99213 OFFICE O/P EST LOW 20 MIN: CPT | Mod: 25

## 2023-04-11 NOTE — PHYSICAL EXAM
[Right] : right knee [5___] : quadriceps 5[unfilled]/5 [Negative] : negative Hayley's [] : no ecchymosis [TWNoteComboBox7] : flexion 120 degrees

## 2023-04-11 NOTE — DISCUSSION/SUMMARY
[de-identified] : Visco-3 injection R knee, tolerated well.\par Ice to affected area.\par Activity modification.

## 2023-04-11 NOTE — HISTORY OF PRESENT ILLNESS
[Other:____] : [unfilled] [2] : 2 [de-identified] : Patient completed Visco-3 #3 left shoulder Oct 20 with some help, has some clicking,R knee HA was done Aug 30 with some clicking, has lymphedema L arm, she presents to continue right knee visco-3 series [] : no [de-identified] : 04/04/2023 [de-identified] : right knee

## 2023-04-11 NOTE — PROCEDURE
[Right] : of the right [Knee] : knee [Pain] : pain [Inflammation] : inflammation [X-ray evidence of Osteoarthritis on this or prior visit] : x-ray evidence of Osteoarthritis on this or prior visit [Repeat series performed] : repeat series performed [Alcohol] : alcohol [Betadine] : betadine [Ethyl Chloride sprayed topically] : ethyl chloride sprayed topically [Sterile technique used] : sterile technique used [Visco-3 (25mg)] : 25mg of Visco-3 [#2] : series #2 [Call if redness, pain or fever occur] : call if redness, pain or fever occur [Apply ice for 15min out of every hour for the next 12-24 hours as tolerated] : apply ice for 15 minutes out of every hour for the next 12-24 hours as tolerated [Patient was advised to rest the joint(s) for ____ days] : patient was advised to rest the joint(s) for [unfilled] days [Previous OTC use and PT nontherapeutic] : patient has tried OTC's including aspirin, Ibuprofen, Aleve, etc or prescription NSAIDS, and/or exercises at home and/or physical therapy without satisfactory response [Patient had decreased mobility in the joint] : patient had decreased mobility in the joint [Risks, benefits, alternatives discussed / Verbal consent obtained] : the risks benefits, and alternatives have been discussed, and verbal consent was obtained [Altered anatomic landmarks d/t erosive arthritis] : altered anatomic landmarks d/t erosive arthritis [All ultrasound images have been permanently captured and stored accordingly in our picture archiving and communication system] : All ultrasound images have been permanently captured and stored accordingly in our picture archiving and communication system

## 2023-04-18 ENCOUNTER — APPOINTMENT (OUTPATIENT)
Dept: ORTHOPEDIC SURGERY | Facility: CLINIC | Age: 63
End: 2023-04-18
Payer: COMMERCIAL

## 2023-04-18 VITALS — HEIGHT: 62.5 IN | WEIGHT: 219 LBS | BODY MASS INDEX: 39.29 KG/M2

## 2023-04-18 PROCEDURE — 20611 DRAIN/INJ JOINT/BURSA W/US: CPT

## 2023-04-18 PROCEDURE — 99212 OFFICE O/P EST SF 10 MIN: CPT | Mod: 25

## 2023-04-18 NOTE — PROCEDURE
[Right] : of the right [Knee] : knee [Pain] : pain [Inflammation] : inflammation [X-ray evidence of Osteoarthritis on this or prior visit] : x-ray evidence of Osteoarthritis on this or prior visit [Repeat series performed] : repeat series performed [Alcohol] : alcohol [Betadine] : betadine [Ethyl Chloride sprayed topically] : ethyl chloride sprayed topically [Sterile technique used] : sterile technique used [Visco-3 (25mg)] : 25mg of Visco-3 [#3] : series #3 [Call if redness, pain or fever occur] : call if redness, pain or fever occur [Apply ice for 15min out of every hour for the next 12-24 hours as tolerated] : apply ice for 15 minutes out of every hour for the next 12-24 hours as tolerated [Patient was advised to rest the joint(s) for ____ days] : patient was advised to rest the joint(s) for [unfilled] days [Previous OTC use and PT nontherapeutic] : patient has tried OTC's including aspirin, Ibuprofen, Aleve, etc or prescription NSAIDS, and/or exercises at home and/or physical therapy without satisfactory response [Patient had decreased mobility in the joint] : patient had decreased mobility in the joint [Risks, benefits, alternatives discussed / Verbal consent obtained] : the risks benefits, and alternatives have been discussed, and verbal consent was obtained [Altered anatomic landmarks d/t erosive arthritis] : altered anatomic landmarks d/t erosive arthritis [All ultrasound images have been permanently captured and stored accordingly in our picture archiving and communication system] : All ultrasound images have been permanently captured and stored accordingly in our picture archiving and communication system

## 2023-04-18 NOTE — HISTORY OF PRESENT ILLNESS
[3] : 3 [Other:____] : [unfilled] [de-identified] : Patient completed Visco-3 #3 left shoulder Oct 20 with some help, has some clicking,R knee HA was done Aug 30 with some clicking, has lymphedema L arm, she presents to complete right knee visco-3 series [] : no [de-identified] : 04/11/2023 [de-identified] : right knee

## 2023-04-18 NOTE — DISCUSSION/SUMMARY
[de-identified] : Visco-3 injection R knee, tolerated well.\par Ice to affected area.\par Activity modification. \par \par Plan for LT shoulder visco-3 series as last series was 10/20/22. 
[FreeTextEntry1] : Pt is here for evaluation of hypertension and complaints of URI. Pt wishes a flu vaccination. Pt denies fevers. She complains of myalgias and fatigue

## 2023-05-16 ENCOUNTER — APPOINTMENT (OUTPATIENT)
Dept: ORTHOPEDIC SURGERY | Facility: CLINIC | Age: 63
End: 2023-05-16
Payer: COMMERCIAL

## 2023-05-16 VITALS — WEIGHT: 219 LBS | BODY MASS INDEX: 39.29 KG/M2 | HEIGHT: 62.5 IN

## 2023-05-16 PROCEDURE — 99212 OFFICE O/P EST SF 10 MIN: CPT | Mod: 25

## 2023-05-16 PROCEDURE — 20611 DRAIN/INJ JOINT/BURSA W/US: CPT | Mod: LT

## 2023-05-16 NOTE — DISCUSSION/SUMMARY
[de-identified] : Visco-3 injection L shoulder tolerated well. \par Ice to affected area.\par Activity modification.

## 2023-05-16 NOTE — HISTORY OF PRESENT ILLNESS
[1] : 1 [Other:____] : [unfilled] [de-identified] : f/u LT shoulder OA, previously did a course of visco-3 LT shoulder; symptoms returned and presents today to repeat series.  [] : This patient has had an injection before: no [de-identified] : left shoulder

## 2023-05-23 ENCOUNTER — APPOINTMENT (OUTPATIENT)
Dept: ORTHOPEDIC SURGERY | Facility: CLINIC | Age: 63
End: 2023-05-23
Payer: COMMERCIAL

## 2023-05-23 PROCEDURE — 20611 DRAIN/INJ JOINT/BURSA W/US: CPT

## 2023-05-23 PROCEDURE — 99212 OFFICE O/P EST SF 10 MIN: CPT | Mod: 25

## 2023-05-23 NOTE — HISTORY OF PRESENT ILLNESS
[2] : 2 [Other:____] : [unfilled] [de-identified] : f/u LT shoulder OA, previously did a course of visco-3 LT shoulder; symptoms returned and presents today to continue the series [] : no [de-identified] : 05/16/2023 [de-identified] : left shoulder

## 2023-05-23 NOTE — DISCUSSION/SUMMARY
[de-identified] : Visco-3 injection L shoulder tolerated well. \par Ice to affected area.\par Activity modification.

## 2023-05-30 ENCOUNTER — APPOINTMENT (OUTPATIENT)
Dept: ORTHOPEDIC SURGERY | Facility: CLINIC | Age: 63
End: 2023-05-30
Payer: COMMERCIAL

## 2023-05-30 DIAGNOSIS — M19.011 PRIMARY OSTEOARTHRITIS, RIGHT SHOULDER: ICD-10-CM

## 2023-05-30 PROCEDURE — 20611 DRAIN/INJ JOINT/BURSA W/US: CPT

## 2023-05-30 PROCEDURE — 99213 OFFICE O/P EST LOW 20 MIN: CPT | Mod: 25

## 2023-05-30 NOTE — HISTORY OF PRESENT ILLNESS
[3] : 3 [de-identified] : f/u LT shoulder OA, for visco-3 LT shoulder #3; symptoms better  [] : no [de-identified] : 5/23/2023 [de-identified] : left shoulder  [de-identified] : visco-3

## 2023-05-30 NOTE — DISCUSSION/SUMMARY
[de-identified] : Visco-3 injection L shoulder tolerated well. \par Ice to affected area.\par Activity modification. \par

## 2023-06-06 ENCOUNTER — APPOINTMENT (OUTPATIENT)
Dept: ORTHOPEDIC SURGERY | Facility: CLINIC | Age: 63
End: 2023-06-06
Payer: MEDICARE

## 2023-06-06 VITALS — WEIGHT: 219 LBS | BODY MASS INDEX: 39.29 KG/M2 | HEIGHT: 62.5 IN

## 2023-06-06 PROCEDURE — 99214 OFFICE O/P EST MOD 30 MIN: CPT

## 2023-06-06 PROCEDURE — 73502 X-RAY EXAM HIP UNI 2-3 VIEWS: CPT

## 2023-06-06 NOTE — PHYSICAL EXAM
[2+] : posterior tibialis pulse: 2+ [] : antalgic [Left] : left hip [All Views] : anteroposterior, lateral [Severe arthritis (Tonnis Grade 3)] : Severe arthritis (Tonnis Grade 3)

## 2023-06-06 NOTE — DISCUSSION/SUMMARY
[de-identified] : Recommend [facility] joint replacement specialist consult for LT JONNA consideration. \par modify activities\par try OTC meds\par ice as needed\par she needs JONNA will have see TJA MD if she desires

## 2023-06-06 NOTE — HISTORY OF PRESENT ILLNESS
[Walking] : walking [5] : 5 [2] : 2 [Dull/Aching] : dull/aching [Sharp] : sharp [Throbbing] : throbbing [Tightness] : tightness [Sleep] : sleep [Heat] : heat [Stairs] : stairs [Lying in bed] : lying in bed [de-identified] : Pt. is a 62 year old female who presents for evaluation of her LT hip. Denies trauma. Symptoms x years, getting progressively worse. Reports groin pain. WB without assistive device. NSAIDS prn.  [] : no [FreeTextEntry1] : Left Hip [FreeTextEntry5] : pt is here for left hip pain, pt believes she has been favoring her left hip due to her knee injections [FreeTextEntry7] : to the groin  [de-identified] : movement

## 2023-06-26 ENCOUNTER — APPOINTMENT (OUTPATIENT)
Dept: ORTHOPEDIC SURGERY | Facility: CLINIC | Age: 63
End: 2023-06-26

## 2023-06-26 ENCOUNTER — APPOINTMENT (OUTPATIENT)
Dept: ORTHOPEDIC SURGERY | Facility: CLINIC | Age: 63
End: 2023-06-26
Payer: MEDICARE

## 2023-06-26 VITALS — WEIGHT: 219 LBS | BODY MASS INDEX: 39.29 KG/M2 | HEIGHT: 62.5 IN

## 2023-06-26 DIAGNOSIS — M16.12 UNILATERAL PRIMARY OSTEOARTHRITIS, LEFT HIP: ICD-10-CM

## 2023-06-26 PROCEDURE — 99215 OFFICE O/P EST HI 40 MIN: CPT

## 2023-06-26 NOTE — ASSESSMENT
[FreeTextEntry1] : 63F p/w adv L hip OA\par \par She would like proceed with L JONNA, r b a explained to patient, we will call to schedule\par \par We discussed my findings and the natural history of their condition. We talked about the details of the proposed surgery and the recovery. We discussed the material risks, possible benefits and alternatives to surgery. The risks include but are not limited to infection, bleeding and possible need for blood transfusion, fracture, bowel blockage, bladder retention or infection, need for reoperation, stiffness and/or limited range of motion, possible damage to nerves and blood vessels, failure of fixation of components, risk of deep vein thromboses and pulmonary embolism, wound healing problems, dislocation, and possible leg length discrepancy. Although incredibly rare, we also discussed the risks of a cardiac event, stroke and even death during, or following, the surgery. We discussed the type of implants the patient will be receiving and the type of fixation that will be used, as well as whether a robot or computer navigation aide will be used. The patient understands they will need medical clearance and will attend a preoperative joint education class. We also discussed the type of anesthesia they will receive, and the risks associated with hospital or rehab length of stay, obesity, diabetes and smoking.\par

## 2023-06-26 NOTE — HISTORY OF PRESENT ILLNESS
[10] : 10 [5] : 5 [Dull/Aching] : dull/aching [Sharp] : sharp [Constant] : constant [Household chores] : household chores [Leisure] : leisure [Work] : work [Sleep] : sleep [Meds] : meds [Heat] : heat [Walking] : walking [Part time] : Work status: part time [de-identified] : 6/26/23: 64 yo f here for the evaluation of her left hip;  Pain to the left groin worsening since 2/2023 after she went on a cruise. Pain worse with stairs and steps. Unable to tie her shoes and is having difficulty bending. There is difficulty sleeping. The weather effects the pain. She has been taking arthritis medications OTC and rapid release tylenol. \par \par Pmhx: Rou-en-y procedure; Breast cancer hx with double mastectomy with maximum chemo/radiation; DM HGA1C? Lympedema\par  [] : no [FreeTextEntry1] : Left hip [FreeTextEntry7] : sometimes to her toes [de-identified] : movement [de-identified] : xrays

## 2023-06-27 ENCOUNTER — NON-APPOINTMENT (OUTPATIENT)
Age: 63
End: 2023-06-27

## 2023-06-28 ENCOUNTER — APPOINTMENT (OUTPATIENT)
Dept: VASCULAR SURGERY | Facility: CLINIC | Age: 63
End: 2023-06-28
Payer: MEDICARE

## 2023-06-28 VITALS
SYSTOLIC BLOOD PRESSURE: 158 MMHG | DIASTOLIC BLOOD PRESSURE: 85 MMHG | BODY MASS INDEX: 39.29 KG/M2 | WEIGHT: 219 LBS | TEMPERATURE: 98.9 F | HEART RATE: 73 BPM | HEIGHT: 62.5 IN

## 2023-06-28 DIAGNOSIS — M16.12 UNILATERAL PRIMARY OSTEOARTHRITIS, LEFT HIP: ICD-10-CM

## 2023-06-28 PROCEDURE — 93971 EXTREMITY STUDY: CPT | Mod: LT

## 2023-06-28 PROCEDURE — 99213 OFFICE O/P EST LOW 20 MIN: CPT

## 2023-06-28 RX ORDER — SODIUM SULFATE, POTASSIUM SULFATE, MAGNESIUM SULFATE 17.5; 3.13; 1.6 G/ML; G/ML; G/ML
17.5-3.13-1.6 SOLUTION, CONCENTRATE ORAL
Qty: 1 | Refills: 0 | Status: DISCONTINUED | COMMUNITY
Start: 2020-08-07 | End: 2023-06-28

## 2023-06-28 RX ORDER — CHOLECALCIFEROL (VITAMIN D3) 25 MCG
TABLET ORAL
Refills: 0 | Status: ACTIVE | COMMUNITY

## 2023-06-28 NOTE — PHYSICAL EXAM
[Respiratory Effort] : normal respiratory effort [Normal Rate and Rhythm] : normal rate and rhythm [2+] : left 2+ [Ankle Swelling (On Exam)] : not present [Varicose Veins Of Lower Extremities] : bilaterally [Ankle Swelling On The Right] : mild [] : not present [Abdomen Tenderness] : ~T ~M No abdominal tenderness [Skin Ulcer] : no ulcer [Alert] : alert [Oriented to Person] : oriented to person [Oriented to Place] : oriented to place [Oriented to Time] : oriented to time [Calm] : calm [de-identified] : appears stated age [de-identified] : normocephalic, atraumatic [de-identified] : supple [FreeTextEntry1] : left upper extremity lymphedema present

## 2023-06-28 NOTE — HISTORY OF PRESENT ILLNESS
[FreeTextEntry1] : 62 year old female who presents for evaluation of left upper extremity lymphedema s/p bilateral mastectomy for breast cancer. She previously underwent left upper extremity venoplasty as well. She recently started physical therapy and has seen some improvement in her left arm including her left wrist contour. She denies pain in either arm. She uses a lymphedema pump every night. [de-identified] : Presents for re-evaluation after she was told she has increased risk of LLE DVT with hip surgery\par She is undergoing evaluation for left hip replacement\par She denies any history of swelling in either leg. \par She denies claudication or rest pain.\par No personal for family history of bleeding or clotting disorders.

## 2023-06-28 NOTE — ASSESSMENT
[FreeTextEntry1] : Problem #1 left hip osteoarthritis\par - no contraindication from vascular standpoint to proceed with left hip surgery\par - pt was advised to stay active before and after surgery and to stay hydrated\par - follow up as needed

## 2023-07-03 ENCOUNTER — OUTPATIENT (OUTPATIENT)
Dept: OUTPATIENT SERVICES | Facility: HOSPITAL | Age: 63
LOS: 1 days | Discharge: HOME | End: 2023-07-03
Payer: MEDICARE

## 2023-07-03 VITALS
TEMPERATURE: 98 F | RESPIRATION RATE: 18 BRPM | OXYGEN SATURATION: 98 % | HEIGHT: 62 IN | HEART RATE: 78 BPM | SYSTOLIC BLOOD PRESSURE: 127 MMHG | DIASTOLIC BLOOD PRESSURE: 64 MMHG | WEIGHT: 219.58 LBS

## 2023-07-03 DIAGNOSIS — E66.9 OBESITY, UNSPECIFIED: ICD-10-CM

## 2023-07-03 DIAGNOSIS — E11.9 TYPE 2 DIABETES MELLITUS WITHOUT COMPLICATIONS: ICD-10-CM

## 2023-07-03 DIAGNOSIS — M16.12 UNILATERAL PRIMARY OSTEOARTHRITIS, LEFT HIP: ICD-10-CM

## 2023-07-03 DIAGNOSIS — I10 ESSENTIAL (PRIMARY) HYPERTENSION: ICD-10-CM

## 2023-07-03 DIAGNOSIS — Z01.818 ENCOUNTER FOR OTHER PREPROCEDURAL EXAMINATION: ICD-10-CM

## 2023-07-03 LAB
A1C WITH ESTIMATED AVERAGE GLUCOSE RESULT: 5.9 % — HIGH (ref 4–5.6)
ALBUMIN SERPL ELPH-MCNC: 4.2 G/DL — SIGNIFICANT CHANGE UP (ref 3.3–5)
ALP SERPL-CCNC: 78 U/L — SIGNIFICANT CHANGE UP (ref 40–120)
ALT FLD-CCNC: 27 U/L — SIGNIFICANT CHANGE UP (ref 12–78)
ANION GAP SERPL CALC-SCNC: 5 MMOL/L — SIGNIFICANT CHANGE UP (ref 5–17)
APTT BLD: 32.6 SEC — SIGNIFICANT CHANGE UP (ref 27.5–35.5)
AST SERPL-CCNC: 13 U/L — LOW (ref 15–37)
BASOPHILS # BLD AUTO: 0.05 K/UL — SIGNIFICANT CHANGE UP (ref 0–0.2)
BASOPHILS NFR BLD AUTO: 1 % — SIGNIFICANT CHANGE UP (ref 0–2)
BILIRUB SERPL-MCNC: 0.4 MG/DL — SIGNIFICANT CHANGE UP (ref 0.2–1.2)
BLD GP AB SCN SERPL QL: SIGNIFICANT CHANGE UP
BUN SERPL-MCNC: 18 MG/DL — SIGNIFICANT CHANGE UP (ref 7–23)
CALCIUM SERPL-MCNC: 10 MG/DL — SIGNIFICANT CHANGE UP (ref 8.5–10.1)
CHLORIDE SERPL-SCNC: 104 MMOL/L — SIGNIFICANT CHANGE UP (ref 96–108)
CO2 SERPL-SCNC: 24 MMOL/L — SIGNIFICANT CHANGE UP (ref 22–31)
CREAT SERPL-MCNC: 0.76 MG/DL — SIGNIFICANT CHANGE UP (ref 0.5–1.3)
EGFR: 87 ML/MIN/1.73M2 — SIGNIFICANT CHANGE UP
EOSINOPHIL # BLD AUTO: 0.1 K/UL — SIGNIFICANT CHANGE UP (ref 0–0.5)
EOSINOPHIL NFR BLD AUTO: 2 % — SIGNIFICANT CHANGE UP (ref 0–6)
ESTIMATED AVERAGE GLUCOSE: 123 MG/DL — HIGH (ref 68–114)
GLUCOSE SERPL-MCNC: 117 MG/DL — HIGH (ref 70–99)
HCT VFR BLD CALC: 36.2 % — SIGNIFICANT CHANGE UP (ref 34.5–45)
HGB BLD-MCNC: 12.5 G/DL — SIGNIFICANT CHANGE UP (ref 11.5–15.5)
IMM GRANULOCYTES NFR BLD AUTO: 0.8 % — SIGNIFICANT CHANGE UP (ref 0–0.9)
INR BLD: 0.93 RATIO — SIGNIFICANT CHANGE UP (ref 0.88–1.16)
LYMPHOCYTES # BLD AUTO: 0.95 K/UL — LOW (ref 1–3.3)
LYMPHOCYTES # BLD AUTO: 18.9 % — SIGNIFICANT CHANGE UP (ref 13–44)
MCHC RBC-ENTMCNC: 28.4 PG — SIGNIFICANT CHANGE UP (ref 27–34)
MCHC RBC-ENTMCNC: 34.5 G/DL — SIGNIFICANT CHANGE UP (ref 32–36)
MCV RBC AUTO: 82.3 FL — SIGNIFICANT CHANGE UP (ref 80–100)
MONOCYTES # BLD AUTO: 0.49 K/UL — SIGNIFICANT CHANGE UP (ref 0–0.9)
MONOCYTES NFR BLD AUTO: 9.8 % — SIGNIFICANT CHANGE UP (ref 2–14)
MRSA PCR RESULT.: SIGNIFICANT CHANGE UP
NEUTROPHILS # BLD AUTO: 3.39 K/UL — SIGNIFICANT CHANGE UP (ref 1.8–7.4)
NEUTROPHILS NFR BLD AUTO: 67.5 % — SIGNIFICANT CHANGE UP (ref 43–77)
NRBC # BLD: 0 /100 WBCS — SIGNIFICANT CHANGE UP (ref 0–0)
PLATELET # BLD AUTO: 305 K/UL — SIGNIFICANT CHANGE UP (ref 150–400)
POTASSIUM SERPL-MCNC: 4.6 MMOL/L — SIGNIFICANT CHANGE UP (ref 3.5–5.3)
POTASSIUM SERPL-SCNC: 4.6 MMOL/L — SIGNIFICANT CHANGE UP (ref 3.5–5.3)
PROT SERPL-MCNC: 7.9 GM/DL — SIGNIFICANT CHANGE UP (ref 6–8.3)
PROTHROM AB SERPL-ACNC: 11.2 SEC — SIGNIFICANT CHANGE UP (ref 10.5–13.4)
RBC # BLD: 4.4 M/UL — SIGNIFICANT CHANGE UP (ref 3.8–5.2)
RBC # FLD: 13.2 % — SIGNIFICANT CHANGE UP (ref 10.3–14.5)
S AUREUS DNA NOSE QL NAA+PROBE: SIGNIFICANT CHANGE UP
SODIUM SERPL-SCNC: 133 MMOL/L — LOW (ref 135–145)
VIT D25+D1,25 OH+D1,25 PNL SERPL-MCNC: 37.7 PG/ML — SIGNIFICANT CHANGE UP (ref 19.9–79.3)
WBC # BLD: 5.02 K/UL — SIGNIFICANT CHANGE UP (ref 3.8–10.5)
WBC # FLD AUTO: 5.02 K/UL — SIGNIFICANT CHANGE UP (ref 3.8–10.5)

## 2023-07-03 PROCEDURE — 93010 ELECTROCARDIOGRAM REPORT: CPT

## 2023-07-03 NOTE — H&P PST ADULT - NSICDXPASTMEDICALHX_GEN_ALL_CORE_FT
PAST MEDICAL HISTORY:  Breast Cancer s/p chemo    DM (diabetes mellitus)     HTN (hypertension)     Lymph Edema left arm

## 2023-07-03 NOTE — H&P PST ADULT - ASSESSMENT
63F pmhx htn, DM, breast cancer (s/p b/l mastectomy/chemo/RT ), lymphedema c/o left hip pain 2/2 unilateral primary osteoarthritis left hip her for PsT for scheduled left anterior total hip arthroplasty  CAPRINI SCORE [CLOT]    AGE RELATED RISK FACTORS                                                       MOBILITY RELATED FACTORS  [ ] Age 41-60 years                                            (1 Point)                  [ ] Bed rest                                                        (1 Point)  [x ] Age: 61-74 years                                           (2 Points)                 [ ] Plaster cast                                                   (2 Points)  [ ] Age= 75 years                                              (3 Points)                 [ ] Bed bound for more than 72 hours                 (2 Points)    DISEASE RELATED RISK FACTORS                                               GENDER SPECIFIC FACTORS  [ ] Edema in the lower extremities                       (1 Point)                  [ ] Pregnancy                                                     (1 Point)  [ ] Varicose veins                                               (1 Point)                  [ ] Post-partum < 6 weeks                                   (1 Point)             [x ] BMI > 25 Kg/m2                                            (1 Point)                  [ ] Hormonal therapy  or oral contraception          (1 Point)                 [ ] Sepsis (in the previous month)                        (1 Point)                  [ ] History of pregnancy complications                 (1 point)  [ ] Pneumonia or serious lung disease                                               [ ] Unexplained or recurrent                     (1 Point)           (in the previous month)                               (1 Point)  [ ] Abnormal pulmonary function test                     (1 Point)                 SURGERY RELATED RISK FACTORS  [ ] Acute myocardial infarction                              (1 Point)                 [ ]  Section                                             (1 Point)  [ ] Congestive heart failure (in the previous month)  (1 Point)               [ ] Minor surgery                                                  (1 Point)   [ ] Inflammatory bowel disease                             (1 Point)                 [ ] Arthroscopic surgery                                        (2 Points)  [ ] Central venous access                                      (2 Points)                [ ] General surgery lasting more than 45 minutes   (2 Points)       [ ] Stroke (in the previous month)                          (5 Points)               [x ] Elective arthroplasty                                         (5 Points)                                                                                                                                               HEMATOLOGY RELATED FACTORS                                                 TRAUMA RELATED RISK FACTORS  [ ] Prior episodes of VTE                                     (3 Points)                [ ] Fracture of the hip, pelvis, or leg                       (5 Points)  [ ] Positive family history for VTE                         (3 Points)                 [ ] Acute spinal cord injury (in the previous month)  (5 Points)  [ ] Prothrombin 10242 A                                     (3 Points)                 [ ] Paralysis  (less than 1 month)                             (5 Points)  [ ] Factor V Leiden                                             (3 Points)                  [ ] Multiple Trauma within 1 month                        (5 Points)  [ ] Lupus anticoagulants                                     (3 Points)                                                           [ ] Anticardiolipin antibodies                               (3 Points)                                                       [ ] High homocysteine in the blood                      (3 Points)                                             [ ] Other congenital or acquired thrombophilia      (3 Points)                                                [ ] Heparin induced thrombocytopenia                  (3 Points)                                          Total Score [      8    ]    Caprini Score 0 - 2:  Low Risk, No VTE Prophylaxis required for most patients, encourage ambulation  Caprini Score 3 - 6:  At Risk, pharmacologic VTE prophylaxis is indicated for most patients (in the absence of a contraindication)  Caprini Score Greater than or = 7:  High Risk, pharmacologic VTE prophylaxis is indicated for most patients (in the absence of a contraindication)

## 2023-07-03 NOTE — OCCUPATIONAL THERAPY INITIAL EVALUATION ADULT - PERTINENT HX OF CURRENT PROBLEM, REHAB EVAL
L hip OA which impacts pts ability to perform functional tasks/transfers and mobility. Pt is scheduled for LTH Anterior approach on 7/18/23.

## 2023-07-03 NOTE — H&P PST ADULT - NS PRO FEM  PAP SMEARS 3YRS
.  Jewell Gloria is a 46 year old female presenting with vagina pain, swelling and drainage x 4 days, was shaving and nicked herself.    Medication verified and med list updated  Denies known Latex allergy or symptoms of Latex sensitivity  Patient would like communication of their results via:     Cell Phone:   Telephone Information:   Mobile [phone]     Okay to leave a message containing results? Yes    Smoking status reviewed       no

## 2023-07-03 NOTE — H&P PST ADULT - NSICDXPASTSURGICALHX_GEN_ALL_CORE_FT
PAST SURGICAL HISTORY:  Breast Augmentation     H/O: Hysterectomy     History of Abdominoplasty     History of Bariatric Surgery     S/P Bilateral Mastectomy     S/P Cholecystectomy     S/P Tonsillectomy

## 2023-07-03 NOTE — H&P PST ADULT - HISTORY OF PRESENT ILLNESS
63F pmhx htn, DM, breast cancer (s/p b/l mastectomy/chemo/RT 2008), lymphedema c/o left hip pain 2/2 unilateral primary osteoarthritis left hip her for PsT for scheduled left anterior total hip arthroplasty  This patient denies any fever, cough, sob, flu like symptoms or travel outside of the [place] in the past 30 days

## 2023-07-03 NOTE — OCCUPATIONAL THERAPY INITIAL EVALUATION ADULT - ADDITIONAL COMMENTS
Pt lives with , son and daughter (Who can assist post op) in a private house with 2-4 steps to enter with bilateral handrails (Far apart). Once inside, the pt has 1 step to a landing (Wide enough to fit a walker) + 10 steps with a L handrail to reach a platform and then 1 step to a platform (Wide enough to fit a walker) to reach the main floor where the bedroom and bathroom is. The pts bathroom has a walk in shower stall, fixed/retractable shower head, comfort height toilet seat and 2x grab bars. The pt reports that a 3/1 commode can fit over the toilet at home. The pt ambulates with a cane prn and does not own any other device for ambulation. The pt daily pain is a 6/10 at rest and a 10/10 with movement. The pt manages the pain with rest, heating pad and Tylenol. The pt goes to outpatient PT 2x a week, no recent falls and has no buckling of the knees. The pt wears glasses all the time, R handed, drives and has Tinnitus both ears.

## 2023-07-03 NOTE — H&P PST ADULT - PROBLEM SELECTOR PLAN 1
left anterior total hip arthroplasty  labs - cbc,pt/ptt,bmp,t&s,nose cx,ekg  M/C required  preop 3 day hibiclens instruction reviewed and given .instructed on if  nose cx positive use mupuricin 5 days and checklist given  take routine meds DOS with sips of water. avoid NSAID and OTC supplements. verbalized understanding  information on proper nutrition , increase protein and better food choices provided in packet   ensure clear not given 2/2 DM  Anesthesiologist to review PST labs, EKG, required clearances and optimization for surgery.

## 2023-07-03 NOTE — H&P PST ADULT - NSICDXPROCEDURE_GEN_ALL_CORE_FT
PROCEDURES:  Total replacement of left hip joint by anterior approach 03-Jul-2023 10:50:01  Naida Grubbs

## 2023-07-18 PROBLEM — I10 ESSENTIAL (PRIMARY) HYPERTENSION: Chronic | Status: ACTIVE | Noted: 2023-07-03

## 2023-07-18 PROBLEM — E11.9 TYPE 2 DIABETES MELLITUS WITHOUT COMPLICATIONS: Chronic | Status: ACTIVE | Noted: 2023-07-03

## 2023-07-19 RX ORDER — HYDROMORPHONE HYDROCHLORIDE 2 MG/ML
0.5 INJECTION INTRAMUSCULAR; INTRAVENOUS; SUBCUTANEOUS
Refills: 0 | Status: COMPLETED | OUTPATIENT
Start: 2023-07-21 | End: 2023-07-28

## 2023-07-19 RX ORDER — PANTOPRAZOLE SODIUM 20 MG/1
40 TABLET, DELAYED RELEASE ORAL
Refills: 0 | Status: DISCONTINUED | OUTPATIENT
Start: 2023-07-21 | End: 2023-07-23

## 2023-07-19 RX ORDER — ACETAMINOPHEN 500 MG
1000 TABLET ORAL ONCE
Refills: 0 | Status: COMPLETED | OUTPATIENT
Start: 2023-07-21 | End: 2023-07-21

## 2023-07-19 RX ORDER — SODIUM CHLORIDE 9 MG/ML
1000 INJECTION, SOLUTION INTRAVENOUS
Refills: 0 | Status: DISCONTINUED | OUTPATIENT
Start: 2023-07-21 | End: 2023-07-23

## 2023-07-19 RX ORDER — CELECOXIB 200 MG/1
200 CAPSULE ORAL EVERY 12 HOURS
Refills: 0 | Status: DISCONTINUED | OUTPATIENT
Start: 2023-07-22 | End: 2023-07-23

## 2023-07-19 RX ORDER — DEXAMETHASONE 0.5 MG/5ML
10 ELIXIR ORAL ONCE
Refills: 0 | Status: COMPLETED | OUTPATIENT
Start: 2023-07-22 | End: 2023-07-22

## 2023-07-19 RX ORDER — OXYCODONE HYDROCHLORIDE 5 MG/1
10 TABLET ORAL EVERY 4 HOURS
Refills: 0 | Status: DISCONTINUED | OUTPATIENT
Start: 2023-07-21 | End: 2023-07-23

## 2023-07-19 RX ORDER — LOSARTAN POTASSIUM 100 MG/1
100 TABLET, FILM COATED ORAL DAILY
Refills: 0 | Status: DISCONTINUED | OUTPATIENT
Start: 2023-07-21 | End: 2023-07-23

## 2023-07-19 RX ORDER — OXYCODONE HYDROCHLORIDE 5 MG/1
5 TABLET ORAL EVERY 4 HOURS
Refills: 0 | Status: DISCONTINUED | OUTPATIENT
Start: 2023-07-21 | End: 2023-07-23

## 2023-07-19 RX ORDER — ASPIRIN/CALCIUM CARB/MAGNESIUM 324 MG
81 TABLET ORAL
Refills: 0 | Status: DISCONTINUED | OUTPATIENT
Start: 2023-07-22 | End: 2023-07-23

## 2023-07-19 RX ORDER — ASCORBIC ACID 60 MG
500 TABLET,CHEWABLE ORAL
Refills: 0 | Status: DISCONTINUED | OUTPATIENT
Start: 2023-07-21 | End: 2023-07-23

## 2023-07-19 RX ORDER — SENNA PLUS 8.6 MG/1
2 TABLET ORAL AT BEDTIME
Refills: 0 | Status: DISCONTINUED | OUTPATIENT
Start: 2023-07-21 | End: 2023-07-23

## 2023-07-19 RX ORDER — ATORVASTATIN CALCIUM 80 MG/1
20 TABLET, FILM COATED ORAL AT BEDTIME
Refills: 0 | Status: DISCONTINUED | OUTPATIENT
Start: 2023-07-21 | End: 2023-07-23

## 2023-07-19 RX ORDER — METFORMIN HYDROCHLORIDE 850 MG/1
500 TABLET ORAL THREE TIMES A DAY
Refills: 0 | Status: DISCONTINUED | OUTPATIENT
Start: 2023-07-21 | End: 2023-07-23

## 2023-07-19 RX ORDER — ONDANSETRON 8 MG/1
4 TABLET, FILM COATED ORAL EVERY 6 HOURS
Refills: 0 | Status: DISCONTINUED | OUTPATIENT
Start: 2023-07-21 | End: 2023-07-23

## 2023-07-19 RX ORDER — LANOLIN ALCOHOL/MO/W.PET/CERES
3 CREAM (GRAM) TOPICAL AT BEDTIME
Refills: 0 | Status: DISCONTINUED | OUTPATIENT
Start: 2023-07-21 | End: 2023-07-23

## 2023-07-19 RX ORDER — POLYETHYLENE GLYCOL 3350 17 G/17G
17 POWDER, FOR SOLUTION ORAL AT BEDTIME
Refills: 0 | Status: DISCONTINUED | OUTPATIENT
Start: 2023-07-21 | End: 2023-07-23

## 2023-07-20 ENCOUNTER — TRANSCRIPTION ENCOUNTER (OUTPATIENT)
Age: 63
End: 2023-07-20

## 2023-07-21 ENCOUNTER — TRANSCRIPTION ENCOUNTER (OUTPATIENT)
Age: 63
End: 2023-07-21

## 2023-07-21 ENCOUNTER — INPATIENT (INPATIENT)
Facility: HOSPITAL | Age: 63
LOS: 1 days | Discharge: HOME HEALTH | End: 2023-07-23
Attending: STUDENT IN AN ORGANIZED HEALTH CARE EDUCATION/TRAINING PROGRAM | Admitting: STUDENT IN AN ORGANIZED HEALTH CARE EDUCATION/TRAINING PROGRAM

## 2023-07-21 ENCOUNTER — APPOINTMENT (OUTPATIENT)
Dept: ORTHOPEDIC SURGERY | Facility: HOSPITAL | Age: 63
End: 2023-07-21
Payer: MEDICARE

## 2023-07-21 VITALS
HEIGHT: 62 IN | RESPIRATION RATE: 17 BRPM | HEART RATE: 75 BPM | WEIGHT: 214.07 LBS | SYSTOLIC BLOOD PRESSURE: 123 MMHG | OXYGEN SATURATION: 96 % | DIASTOLIC BLOOD PRESSURE: 82 MMHG | TEMPERATURE: 98 F

## 2023-07-21 DIAGNOSIS — Z92.89 PERSONAL HISTORY OF OTHER MEDICAL TREATMENT: Chronic | ICD-10-CM

## 2023-07-21 DIAGNOSIS — Z98.49 CATARACT EXTRACTION STATUS, UNSPECIFIED EYE: Chronic | ICD-10-CM

## 2023-07-21 DIAGNOSIS — Z98.890 OTHER SPECIFIED POSTPROCEDURAL STATES: Chronic | ICD-10-CM

## 2023-07-21 LAB
ANION GAP SERPL CALC-SCNC: 6 MMOL/L — SIGNIFICANT CHANGE UP (ref 5–17)
BLD GP AB SCN SERPL QL: SIGNIFICANT CHANGE UP
BUN SERPL-MCNC: 13 MG/DL — SIGNIFICANT CHANGE UP (ref 7–23)
CALCIUM SERPL-MCNC: 9.1 MG/DL — SIGNIFICANT CHANGE UP (ref 8.5–10.1)
CHLORIDE SERPL-SCNC: 106 MMOL/L — SIGNIFICANT CHANGE UP (ref 96–108)
CO2 SERPL-SCNC: 24 MMOL/L — SIGNIFICANT CHANGE UP (ref 22–31)
CREAT SERPL-MCNC: 0.73 MG/DL — SIGNIFICANT CHANGE UP (ref 0.5–1.3)
EGFR: 91 ML/MIN/1.73M2 — SIGNIFICANT CHANGE UP
GLUCOSE SERPL-MCNC: 163 MG/DL — HIGH (ref 70–99)
HCT VFR BLD CALC: 30.1 % — LOW (ref 34.5–45)
HGB BLD-MCNC: 10.1 G/DL — LOW (ref 11.5–15.5)
MCHC RBC-ENTMCNC: 28.4 PG — SIGNIFICANT CHANGE UP (ref 27–34)
MCHC RBC-ENTMCNC: 33.6 G/DL — SIGNIFICANT CHANGE UP (ref 32–36)
MCV RBC AUTO: 84.6 FL — SIGNIFICANT CHANGE UP (ref 80–100)
NRBC # BLD: 0 /100 WBCS — SIGNIFICANT CHANGE UP (ref 0–0)
PLATELET # BLD AUTO: 243 K/UL — SIGNIFICANT CHANGE UP (ref 150–400)
POTASSIUM SERPL-MCNC: 4.8 MMOL/L — SIGNIFICANT CHANGE UP (ref 3.5–5.3)
POTASSIUM SERPL-SCNC: 4.8 MMOL/L — SIGNIFICANT CHANGE UP (ref 3.5–5.3)
RBC # BLD: 3.56 M/UL — LOW (ref 3.8–5.2)
RBC # FLD: 13 % — SIGNIFICANT CHANGE UP (ref 10.3–14.5)
SODIUM SERPL-SCNC: 136 MMOL/L — SIGNIFICANT CHANGE UP (ref 135–145)
WBC # BLD: 10.33 K/UL — SIGNIFICANT CHANGE UP (ref 3.8–10.5)
WBC # FLD AUTO: 10.33 K/UL — SIGNIFICANT CHANGE UP (ref 3.8–10.5)

## 2023-07-21 PROCEDURE — 27130 TOTAL HIP ARTHROPLASTY: CPT | Mod: LT

## 2023-07-21 PROCEDURE — 27130 TOTAL HIP ARTHROPLASTY: CPT | Mod: AS,LT

## 2023-07-21 DEVICE — HEAD DELTA CER 32MM: Type: IMPLANTABLE DEVICE | Site: LEFT | Status: FUNCTIONAL

## 2023-07-21 DEVICE — STEM FEM ACTIS TAPR COLLAR STD SZ 2: Type: IMPLANTABLE DEVICE | Site: LEFT | Status: FUNCTIONAL

## 2023-07-21 DEVICE — ALTRAX NEUT 32IDX48OD: Type: IMPLANTABLE DEVICE | Site: LEFT | Status: FUNCTIONAL

## 2023-07-21 DEVICE — CUP ACETABULAR 48MM: Type: IMPLANTABLE DEVICE | Site: LEFT | Status: FUNCTIONAL

## 2023-07-21 RX ORDER — DEXTROSE 50 % IN WATER 50 %
25 SYRINGE (ML) INTRAVENOUS ONCE
Refills: 0 | Status: DISCONTINUED | OUTPATIENT
Start: 2023-07-21 | End: 2023-07-23

## 2023-07-21 RX ORDER — BENZOCAINE AND MENTHOL 5; 1 G/100ML; G/100ML
1 LIQUID ORAL THREE TIMES A DAY
Refills: 0 | Status: DISCONTINUED | OUTPATIENT
Start: 2023-07-21 | End: 2023-07-23

## 2023-07-21 RX ORDER — ASPIRIN/CALCIUM CARB/MAGNESIUM 324 MG
1 TABLET ORAL
Qty: 60 | Refills: 0
Start: 2023-07-21 | End: 2023-08-19

## 2023-07-21 RX ORDER — SODIUM CHLORIDE 9 MG/ML
1000 INJECTION, SOLUTION INTRAVENOUS
Refills: 0 | Status: DISCONTINUED | OUTPATIENT
Start: 2023-07-21 | End: 2023-07-23

## 2023-07-21 RX ORDER — SITAGLIPTIN 50 MG/1
1 TABLET, FILM COATED ORAL
Refills: 0 | DISCHARGE

## 2023-07-21 RX ORDER — CEFAZOLIN SODIUM 1 G
2000 VIAL (EA) INJECTION EVERY 8 HOURS
Refills: 0 | Status: COMPLETED | OUTPATIENT
Start: 2023-07-21 | End: 2023-07-22

## 2023-07-21 RX ORDER — SODIUM CHLORIDE 9 MG/ML
3 INJECTION INTRAMUSCULAR; INTRAVENOUS; SUBCUTANEOUS EVERY 8 HOURS
Refills: 0 | Status: DISCONTINUED | OUTPATIENT
Start: 2023-07-21 | End: 2023-07-21

## 2023-07-21 RX ORDER — NALOXONE HYDROCHLORIDE 4 MG/.1ML
4 SPRAY NASAL
Qty: 1 | Refills: 0
Start: 2023-07-21 | End: 2023-07-21

## 2023-07-21 RX ORDER — ACETAMINOPHEN 500 MG
1000 TABLET ORAL ONCE
Refills: 0 | Status: DISCONTINUED | OUTPATIENT
Start: 2023-07-21 | End: 2023-07-23

## 2023-07-21 RX ORDER — CELECOXIB 200 MG/1
200 CAPSULE ORAL ONCE
Refills: 0 | Status: COMPLETED | OUTPATIENT
Start: 2023-07-21 | End: 2023-07-21

## 2023-07-21 RX ORDER — ACETAMINOPHEN 500 MG
650 TABLET ORAL ONCE
Refills: 0 | Status: COMPLETED | OUTPATIENT
Start: 2023-07-21 | End: 2023-07-21

## 2023-07-21 RX ORDER — PANTOPRAZOLE SODIUM 20 MG/1
1 TABLET, DELAYED RELEASE ORAL
Qty: 30 | Refills: 0
Start: 2023-07-21 | End: 2023-08-19

## 2023-07-21 RX ORDER — OXYCODONE HYDROCHLORIDE 5 MG/1
1 TABLET ORAL
Qty: 42 | Refills: 0
Start: 2023-07-21 | End: 2023-07-27

## 2023-07-21 RX ORDER — METFORMIN HYDROCHLORIDE 850 MG/1
1 TABLET ORAL
Refills: 0 | DISCHARGE

## 2023-07-21 RX ORDER — POLYETHYLENE GLYCOL 3350 17 G/17G
17 POWDER, FOR SOLUTION ORAL
Qty: 0 | Refills: 0 | DISCHARGE
Start: 2023-07-21

## 2023-07-21 RX ORDER — SODIUM CHLORIDE 9 MG/ML
1000 INJECTION, SOLUTION INTRAVENOUS
Refills: 0 | Status: DISCONTINUED | OUTPATIENT
Start: 2023-07-21 | End: 2023-07-21

## 2023-07-21 RX ORDER — HYDROMORPHONE HYDROCHLORIDE 2 MG/ML
0.5 INJECTION INTRAMUSCULAR; INTRAVENOUS; SUBCUTANEOUS
Refills: 0 | Status: DISCONTINUED | OUTPATIENT
Start: 2023-07-21 | End: 2023-07-21

## 2023-07-21 RX ORDER — CEPHALEXIN 500 MG
1 CAPSULE ORAL
Refills: 0 | DISCHARGE

## 2023-07-21 RX ORDER — IRBESARTAN 75 MG/1
1 TABLET ORAL
Refills: 0 | DISCHARGE

## 2023-07-21 RX ORDER — DEXTROSE 50 % IN WATER 50 %
15 SYRINGE (ML) INTRAVENOUS ONCE
Refills: 0 | Status: DISCONTINUED | OUTPATIENT
Start: 2023-07-21 | End: 2023-07-23

## 2023-07-21 RX ORDER — INSULIN LISPRO 100/ML
VIAL (ML) SUBCUTANEOUS
Refills: 0 | Status: DISCONTINUED | OUTPATIENT
Start: 2023-07-21 | End: 2023-07-23

## 2023-07-21 RX ORDER — CEPHALEXIN 500 MG
500 CAPSULE ORAL
Refills: 0 | Status: DISCONTINUED | OUTPATIENT
Start: 2023-07-21 | End: 2023-07-21

## 2023-07-21 RX ORDER — DEXTROSE 50 % IN WATER 50 %
12.5 SYRINGE (ML) INTRAVENOUS ONCE
Refills: 0 | Status: DISCONTINUED | OUTPATIENT
Start: 2023-07-21 | End: 2023-07-23

## 2023-07-21 RX ORDER — SENNA PLUS 8.6 MG/1
2 TABLET ORAL
Qty: 0 | Refills: 0 | DISCHARGE
Start: 2023-07-21

## 2023-07-21 RX ORDER — HYDROMORPHONE HYDROCHLORIDE 2 MG/ML
0.5 INJECTION INTRAMUSCULAR; INTRAVENOUS; SUBCUTANEOUS
Refills: 0 | Status: DISCONTINUED | OUTPATIENT
Start: 2023-07-21 | End: 2023-07-23

## 2023-07-21 RX ORDER — ASCORBIC ACID 60 MG
1 TABLET,CHEWABLE ORAL
Qty: 0 | Refills: 0 | DISCHARGE
Start: 2023-07-21

## 2023-07-21 RX ORDER — GLUCAGON INJECTION, SOLUTION 0.5 MG/.1ML
1 INJECTION, SOLUTION SUBCUTANEOUS ONCE
Refills: 0 | Status: DISCONTINUED | OUTPATIENT
Start: 2023-07-21 | End: 2023-07-23

## 2023-07-21 RX ORDER — ONDANSETRON 8 MG/1
4 TABLET, FILM COATED ORAL ONCE
Refills: 0 | Status: DISCONTINUED | OUTPATIENT
Start: 2023-07-21 | End: 2023-07-21

## 2023-07-21 RX ORDER — CELECOXIB 200 MG/1
1 CAPSULE ORAL
Qty: 60 | Refills: 0
Start: 2023-07-21 | End: 2023-08-19

## 2023-07-21 RX ADMIN — CELECOXIB 200 MILLIGRAM(S): 200 CAPSULE ORAL at 08:30

## 2023-07-21 RX ADMIN — Medication 3 MILLIGRAM(S): at 22:14

## 2023-07-21 RX ADMIN — Medication 400 MILLIGRAM(S): at 22:13

## 2023-07-21 RX ADMIN — OXYCODONE HYDROCHLORIDE 10 MILLIGRAM(S): 5 TABLET ORAL at 15:51

## 2023-07-21 RX ADMIN — OXYCODONE HYDROCHLORIDE 10 MILLIGRAM(S): 5 TABLET ORAL at 00:00

## 2023-07-21 RX ADMIN — POLYETHYLENE GLYCOL 3350 17 GRAM(S): 17 POWDER, FOR SOLUTION ORAL at 22:13

## 2023-07-21 RX ADMIN — Medication 650 MILLIGRAM(S): at 08:30

## 2023-07-21 RX ADMIN — SODIUM CHLORIDE 115 MILLILITER(S): 9 INJECTION, SOLUTION INTRAVENOUS at 22:14

## 2023-07-21 RX ADMIN — Medication 1000 MILLIGRAM(S): at 22:50

## 2023-07-21 RX ADMIN — Medication 100 MILLIGRAM(S): at 17:18

## 2023-07-21 RX ADMIN — Medication 8: at 16:24

## 2023-07-21 RX ADMIN — OXYCODONE HYDROCHLORIDE 10 MILLIGRAM(S): 5 TABLET ORAL at 22:13

## 2023-07-21 RX ADMIN — SODIUM CHLORIDE 115 MILLILITER(S): 9 INJECTION, SOLUTION INTRAVENOUS at 12:39

## 2023-07-21 RX ADMIN — ATORVASTATIN CALCIUM 20 MILLIGRAM(S): 80 TABLET, FILM COATED ORAL at 22:14

## 2023-07-21 RX ADMIN — METFORMIN HYDROCHLORIDE 500 MILLIGRAM(S): 850 TABLET ORAL at 13:39

## 2023-07-21 RX ADMIN — OXYCODONE HYDROCHLORIDE 10 MILLIGRAM(S): 5 TABLET ORAL at 18:01

## 2023-07-21 RX ADMIN — Medication 500 MILLIGRAM(S): at 17:47

## 2023-07-21 RX ADMIN — METFORMIN HYDROCHLORIDE 500 MILLIGRAM(S): 850 TABLET ORAL at 22:13

## 2023-07-21 RX ADMIN — SENNA PLUS 2 TABLET(S): 8.6 TABLET ORAL at 23:21

## 2023-07-21 NOTE — DISCHARGE NOTE PROVIDER - NSDCMRMEDTOKEN_GEN_ALL_CORE_FT
irbesartan 300 mg oral tablet: 1 orally once a day  Januvia 100 mg oral tablet: 1 orally once a day  metformin 1500mg TID:   rosuvastatin 5 mg oral tablet: 1 orally once a day  Tylenol 500 mg oral tablet: 2 orally prn   ascorbic acid 500 mg oral tablet: 1 tab(s) orally 2 times a day  Aspirin Enteric Coated 81 mg oral delayed release tablet: 1 tab(s) orally 2 times a day MDD: 2  celecoxib 200 mg oral capsule: 1 cap(s) orally every 12 hours  irbesartan 300 mg oral tablet: 1 orally once a day  metFORMIN 500 mg oral tablet: 1 tab(s) orally 3 times a day  Multiple Vitamins oral tablet: 1 tab(s) orally once a day  Narcan 4 mg/0.1 mL nasal spray: 4 milligram(s) intranasally once , repeat as necessary.   As needed. For suspected opiate overdose   Follow instructions on packet MDD: 0.2 ml  oxyCODONE 5 mg oral tablet: 1 tab(s) orally every 4 hours as needed for  pain 1 tab for mild/moderate pain, 2 tabs for severe pain MDD: 6  pantoprazole 40 mg oral delayed release tablet: 1 tab(s) orally once a day (before a meal) MDD: 1  polyethylene glycol 3350 oral powder for reconstitution: 17 gram(s) orally once a day (at bedtime)  rosuvastatin 5 mg oral tablet: 1 orally once a day  senna leaf extract oral tablet: 2 tab(s) orally once a day (at bedtime)  Tylenol 500 mg oral tablet: 2 tab(s) orally every 6 hours   ascorbic acid 500 mg oral tablet: 1 tab(s) orally 2 times a day  Aspirin Enteric Coated 81 mg oral delayed release tablet: 1 tab(s) orally 2 times a day MDD: 2  celecoxib 200 mg oral capsule: 1 cap(s) orally every 12 hours  irbesartan 300 mg oral tablet: 1 orally once a day  metFORMIN 500 mg oral tablet: 1 tab(s) orally 3 times a day  Multiple Vitamins oral tablet: 1 tab(s) orally once a day  Narcan 4 mg/0.1 mL nasal spray: 4 milligram(s) intranasally once , repeat as necessary.   As needed. For suspected opiate overdose   Follow instructions on packet MDD: 0.2 ml  oxyCODONE 5 mg oral tablet: 1 tab(s) orally every 4 hours as needed for  pain 1 tab for mild/moderate pain, 2 tabs for severe pain MDD: 6  pantoprazole 40 mg oral delayed release tablet: 1 tab(s) orally once a day  pantoprazole 40 mg oral delayed release tablet: 1 tab(s) orally once a day (before a meal) MDD: 1  polyethylene glycol 3350 oral powder for reconstitution: 17 gram(s) orally once a day (at bedtime)  rosuvastatin 5 mg oral tablet: 1 orally once a day  senna leaf extract oral tablet: 2 tab(s) orally once a day (at bedtime)  Tylenol 500 mg oral tablet: 2 tab(s) orally every 6 hours

## 2023-07-21 NOTE — DISCHARGE NOTE PROVIDER - CARE PROVIDER_API CALL
Aston Gutierrez  Orthopaedic Surgery  [address], [address]  [place] [address]  Phone: [phone]  Fax: [phone]  Follow Up Time:

## 2023-07-21 NOTE — PHYSICAL THERAPY INITIAL EVALUATION ADULT - GAIT DEVIATIONS NOTED, PT EVAL
decreased iban/increased time in double stance/decreased step length/decreased weight-shifting ability

## 2023-07-21 NOTE — PROGRESS NOTE ADULT - SUBJECTIVE AND OBJECTIVE BOX
Patient is seen and examined at bedside. Denies CP/SOB/Dizziness/N/V/D/HA. Pain is controlled.     Vital Signs Last 24 Hrs  T(C): 36.6 (21 Jul 2023 12:33), Max: 36.6 (21 Jul 2023 08:05)  T(F): 97.8 (21 Jul 2023 12:33), Max: 97.8 (21 Jul 2023 08:05)  HR: 74 (21 Jul 2023 12:33) (68 - 80)  BP: 103/74 (21 Jul 2023 12:33) (98/44 - 128/103)  BP(mean): --  RR: 16 (21 Jul 2023 12:33) (10 - 17)  SpO2: 95% (21 Jul 2023 12:33) (95% - 97%)    Parameters below as of 21 Jul 2023 12:33  Patient On (Oxygen Delivery Method): room air        GEN: NAD  ABD: soft, NT/ND; no rebound or guarding;  Neurologic: AAOx3; CNS grossly intact; no focal deficits  LUE: Lymphedema  RLE: Motor intact + EHL/FHL/TA/GS. Sensation is grossly intact.  Extremities warm. . Compartments soft, compressible. No calf tenderness. DP 2+.  LLE: Prineo dressing C/D/I.  Motor intact + EHL/FHL/TA/GS. Sensation is grossly intact. Extremities warm. Compartments soft, compressible. No calf tenderness.. DP 2+.    Labs:                          10.1   10.33 )-----------( 243      ( 21 Jul 2023 11:04 )             30.1       07-21    136  |  106  |  13  ----------------------------<  163<H>  4.8   |  24  |  0.73    Ca    9.1      21 Jul 2023 11:04        A/P: Patient is a 63y y/o Female s/p left anterior JONNA, POD # 0  -wound care, isometric exercises, GI motility, new medications, hip precautions,  hospital course and discharge planning reviewed with pt  -Pain control/analgesia reviewed   -Inc spirometry reviewed and counseled  -Venodynes/foot pumps  -F/U AM Labs  -Medical consult pending   -PT/OT/WBAT  -Antibiotic per SCIPS  -Anticoagulation: asa 81mg BID  -DC plan: home tomorrow with home care

## 2023-07-21 NOTE — DISCHARGE NOTE PROVIDER - NSDCFUSCHEDAPPT_GEN_ALL_CORE_FT
Aston Gutierrez  [place] [facility]  [address]   Scheduled Appointment: 07/21/2023    Aston Gutierrez  [place] [facility]  [address]  Scheduled Appointment: 08/07/2023     [facility]  [address]  Scheduled Appointment: 08/07/2023

## 2023-07-21 NOTE — PATIENT PROFILE ADULT - FALL HARM RISK - RISK INTERVENTIONS

## 2023-07-21 NOTE — PHYSICAL THERAPY INITIAL EVALUATION ADULT - ADDITIONAL COMMENTS
has 3 steps with bilateral rails to enter (far apart). Has a cane at home. 1 flight of stairs to second floor bedroom with rail.

## 2023-07-21 NOTE — DISCHARGE NOTE PROVIDER - NSDCFUADDINST_GEN_ALL_CORE_FT
36.9
Anterior Hip replacement precautions: Elevate the leg (while keeping hip precautions) as often as possible to help control swelling. Incentive spirometer 10X/hr.   Keep Prineo Dressing Clean, Dry and Intact. May shower with Prineo Dressing. Please do not scrub, soak, peel or pick at the prineo dressing. No creams, lotions, or oils over dressing. May shower and let water run over incision, no baths. Pat dry once out of shower. Dressing to be removed in office at follow up visit in 2 weeks. There are no staples or stitches that need to be removed.

## 2023-07-21 NOTE — BRIEF OPERATIVE NOTE - NSICDXBRIEFPROCEDURE_GEN_ALL_CORE_FT
PROCEDURES:  Total replacement of hip joint by anterior approach 21-Jul-2023 11:00:22  Dario Castillo

## 2023-07-21 NOTE — DISCHARGE NOTE PROVIDER - NSDCCPTREATMENT_GEN_ALL_CORE_FT
PRINCIPAL PROCEDURE  Procedure: Total replacement of hip joint by anterior approach  Findings and Treatment: left

## 2023-07-21 NOTE — ASU PREOP CHECKLIST - HEIGHT IN FEET
[facility] Medicine Services  DISCHARGE SUMMARY       Date of Admission: 6/5/2020  Date of Discharge:  6/11/2020  Primary Care Physician: Provider, No Known    Presenting Problem/History of Present Illness:  Precordial pain [R07.2]  Angina at rest (CMS/[facility]) [I20.8]  Pneumonia of right lower lobe due to infectious organism [J18.9]  Precordial pain [R07.2]       Final Discharge Diagnoses:  Active Hospital Problems    Diagnosis   • **NSTEMI (non-ST elevated myocardial infarction) (CMS/[facility])   • Coronary artery disease involving native coronary artery of native heart with unstable angina pectoris (CMS/[facility])   • Precordial pain   • Essential hypertension   • COPD (chronic obstructive pulmonary disease) (CMS/[facility])       Consults:   Consults     Date and Time Order Name Status Description    6/10/2020 1107 Inpatient Consult to Anesthesiology      6/8/2020 0908 Inpatient Cardiothoracic Surgery Consult Completed     6/6/2020 0841 Inpatient Nephrology Consult Completed     6/5/2020 2022 Inpatient Cardiology Consult      6/5/2020 1914 Hospitalist (on-call MD unless specified)            Procedures Performed: Procedure(s):  Left Heart Cath                Pertinent Test Results:   Lab Results (most recent)     Procedure Component Value Units Date/Time    Comprehensive Metabolic Panel [[id number]]  (Abnormal) Collected:  06/10/20 0608    Specimen:  Blood Updated:  06/10/20 0629     Glucose 104 mg/dL      BUN 22 mg/dL      Creatinine 1.58 mg/dL      Sodium 136 mmol/L      Potassium 4.0 mmol/L      Chloride 100 mmol/L      CO2 30.0 mmol/L      Calcium 8.5 mg/dL      Total Protein 6.3 g/dL      Albumin 3.60 g/dL      ALT (SGPT) 25 U/L      AST (SGOT) 27 U/L      Alkaline Phosphatase 55 U/L      Total Bilirubin 0.6 mg/dL      eGFR Non African Amer 44 mL/min/1.73      Globulin 2.7 gm/dL      A/G Ratio 1.3 g/dL      BUN/Creatinine Ratio 13.9     Anion Gap 6.0 mmol/L     Narrative:       GFR Normal  >60  Chronic Kidney Disease <60  Kidney Failure <15      CBC & Differential [[id number]] Collected:  06/10/20 0609    Specimen:  Blood Updated:  06/10/20 0612    Narrative:       The following orders were created for panel order CBC & Differential.  Procedure                               Abnormality         Status                     ---------                               -----------         ------                     CBC Auto Differential[[id number]]        Abnormal            Final result                 Please view results for these tests on the individual orders.    CBC Auto Differential [[id number]]  (Abnormal) Collected:  06/10/20 0609    Specimen:  Blood Updated:  06/10/20 0612     WBC 9.57 10*3/mm3      RBC 4.10 10*6/mm3      Hemoglobin 12.9 g/dL      Hematocrit 39.7 %      MCV 96.8 fL      MCH 31.5 pg      MCHC 32.5 g/dL      RDW 12.5 %      RDW-SD 43.9 fl      MPV 11.1 fL      Platelets 161 10*3/mm3      Neutrophil % 74.2 %      Lymphocyte % 13.5 %      Monocyte % 10.0 %      Eosinophil % 1.6 %      Basophil % 0.4 %      Immature Grans % 0.3 %      Neutrophils, Absolute 7.10 10*3/mm3      Lymphocytes, Absolute 1.29 10*3/mm3      Monocytes, Absolute 0.96 10*3/mm3      Eosinophils, Absolute 0.15 10*3/mm3      Basophils, Absolute 0.04 10*3/mm3      Immature Grans, Absolute 0.03 10*3/mm3      nRBC 0.0 /100 WBC     Blood Culture - Blood, Arm, Left [[id number]] Collected:  06/05/20 2032    Specimen:  Blood from Arm, Left Updated:  06/09/20 2046     Blood Culture No growth at 4 days    Blood Culture - Blood, Arm, Left [[id number]] Collected:  06/05/20 1952    Specimen:  Blood from Arm, Left Updated:  06/09/20 2000     Blood Culture No growth at 4 days    Blood Gas, Arterial [[id number]]  (Abnormal) Collected:  06/09/20 1200    Specimen:  Arterial Blood Updated:  06/09/20 1212     Site Right Radial     Terrence's Test Positive     pH, Arterial 7.377 pH units      pCO2, Arterial 49.2 mm Hg      Comment: 83 Value above  reference range        pO2, Arterial 80.7 mm Hg      Comment: 84 Value below reference range        HCO3, Arterial 28.9 mmol/L      Comment: 83 Value above reference range        Base Excess, Arterial 2.8 mmol/L      Comment: 83 Value above reference range        O2 Saturation, Arterial 95.7 %      Barometric Pressure for Blood Gas 741 mmHg      Modality Room Air     Ventilator Mode NA     Collected by PM     Comment: Meter: [id number]     :  [id number]       TSH [[id number]]  (Normal) Collected:  06/09/20 0650    Specimen:  Blood Updated:  06/09/20 0802     TSH 1.680 uIU/mL     Basic Metabolic Panel [[id number]]  (Abnormal) Collected:  06/09/20 0650    Specimen:  Blood Updated:  06/09/20 0757     Glucose 95 mg/dL      BUN 24 mg/dL      Creatinine 1.64 mg/dL      Sodium 138 mmol/L      Potassium 4.3 mmol/L      Chloride 102 mmol/L      CO2 31.0 mmol/L      Calcium 8.3 mg/dL      eGFR Non African Amer 42 mL/min/1.73      BUN/Creatinine Ratio 14.6     Anion Gap 5.0 mmol/L     Narrative:       GFR Normal >60  Chronic Kidney Disease <60  Kidney Failure <15      Lipid Panel [[id number]]  (Abnormal) Collected:  06/09/20 0650    Specimen:  Blood Updated:  06/09/20 0757     Total Cholesterol 198 mg/dL      Triglycerides 102 mg/dL      HDL Cholesterol 26 mg/dL      LDL Cholesterol  152 mg/dL      VLDL Cholesterol 20.4 mg/dL      LDL/HDL Ratio 5.83    Narrative:       Cholesterol Reference Ranges  (U.S. Department of Health and Human Services ATP III Classifications)    Desirable          <200 mg/dL  Borderline High    200-239 mg/dL  High Risk          >240 mg/dL      Triglyceride Reference Ranges  (U.S. Department of Health and Human Services ATP III Classifications)    Normal           <150 mg/dL  Borderline High  150-199 mg/dL  High             200-499 mg/dL  Very High        >500 mg/dL    HDL Reference Ranges  (U.S. Department of Health and Human Services ATP III Classifcations)    Low     <40 mg/dl (major risk  factor for CHD)  High    >60 mg/dl ('negative' risk factor for CHD)        LDL Reference Ranges  (U.S. Department of Health and Human Services ATP III Classifcations)    Optimal          <100 mg/dL  Near Optimal     100-129 mg/dL  Borderline High  130-159 mg/dL  High             160-189 mg/dL  Very High        >189 mg/dL    CBC & Differential [[id number]] Collected:  06/09/20 0650    Specimen:  Blood Updated:  06/09/20 0733    Narrative:       The following orders were created for panel order CBC & Differential.  Procedure                               Abnormality         Status                     ---------                               -----------         ------                     CBC Auto Differential[[id number]]        Abnormal            Final result                 Please view results for these tests on the individual orders.    CBC Auto Differential [[id number]]  (Abnormal) Collected:  06/09/20 0650    Specimen:  Blood Updated:  06/09/20 0733     WBC 9.45 10*3/mm3      RBC 4.11 10*6/mm3      Hemoglobin 13.1 g/dL      Hematocrit 39.8 %      MCV 96.8 fL      MCH 31.9 pg      MCHC 32.9 g/dL      RDW 12.6 %      RDW-SD 44.6 fl      MPV 11.2 fL      Platelets 156 10*3/mm3      Neutrophil % 76.6 %      Lymphocyte % 13.3 %      Monocyte % 8.8 %      Eosinophil % 0.7 %      Basophil % 0.3 %      Immature Grans % 0.3 %      Neutrophils, Absolute 7.23 10*3/mm3      Lymphocytes, Absolute 1.26 10*3/mm3      Monocytes, Absolute 0.83 10*3/mm3      Eosinophils, Absolute 0.07 10*3/mm3      Basophils, Absolute 0.03 10*3/mm3      Immature Grans, Absolute 0.03 10*3/mm3      nRBC 0.0 /100 WBC     MRSA Screen, PCR (Inpatient) - Swab, Nares [[id number]]  (Normal) Collected:  06/08/20 1732    Specimen:  Swab from Nares Updated:  06/08/20 1904     MRSA, PCR Negative    Narrative:       Performed by real-time polymerase chain reaction (qPCR).    Comprehensive Metabolic Panel [[id number]]  (Abnormal) Collected:  06/08/20 0547    Specimen:   Blood Updated:  06/08/20 0616     Glucose 101 mg/dL      BUN 27 mg/dL      Creatinine 1.91 mg/dL      Sodium 136 mmol/L      Potassium 3.8 mmol/L      Chloride 97 mmol/L      CO2 30.0 mmol/L      Calcium 8.2 mg/dL      Total Protein 6.9 g/dL      Albumin 4.10 g/dL      ALT (SGPT) 17 U/L      AST (SGOT) 19 U/L      Alkaline Phosphatase 60 U/L      Total Bilirubin 0.7 mg/dL      eGFR Non African Amer 35 mL/min/1.73      Globulin 2.8 gm/dL      A/G Ratio 1.5 g/dL      BUN/Creatinine Ratio 14.1     Anion Gap 9.0 mmol/L     Narrative:       GFR Normal >60  Chronic Kidney Disease <60  Kidney Failure <15      Protime-INR [[id number]]  (Normal) Collected:  06/07/20 2116    Specimen:  Blood Updated:  06/07/20 2144     Protime 13.6 Seconds      INR 1.06    Narrative:       Therapeutic range for most indications is 2.0-3.0 INR,  or 2.5-3.5 for mechanical heart valves.    Basic Metabolic Panel [[id number]]  (Abnormal) Collected:  06/07/20 0814    Specimen:  Blood Updated:  06/07/20 0844     Glucose 99 mg/dL      BUN 25 mg/dL      Creatinine 1.71 mg/dL      Sodium 138 mmol/L      Potassium 3.9 mmol/L      Chloride 99 mmol/L      CO2 31.0 mmol/L      Calcium 8.6 mg/dL      eGFR Non African Amer 40 mL/min/1.73      BUN/Creatinine Ratio 14.6     Anion Gap 8.0 mmol/L     Narrative:       GFR Normal >60  Chronic Kidney Disease <60  Kidney Failure <15      Urinalysis With Microscopic If Indicated (No Culture) - Urine, Clean Catch [[id number]]  (Normal) Collected:  06/06/20 1128    Specimen:  Urine, Clean Catch Updated:  06/06/20 1144     Color, UA Yellow     Appearance, UA Clear     pH, UA 7.0     Specific Gravity, UA 1.008     Glucose, UA Negative     Ketones, UA Negative     Bilirubin, UA Negative     Blood, UA Negative     Protein, UA Negative     Leuk Esterase, UA Negative     Nitrite, UA Negative     Urobilinogen, UA 1.0 E.U./dL    Narrative:       Urine microscopic not indicated.    Extra Tubes [[id number]] Collected:  06/06/20  0935    Specimen:  Blood, Venous Line Updated:  06/06/20 1046    Narrative:       The following orders were created for panel order Extra Tubes.  Procedure                               Abnormality         Status                     ---------                               -----------         ------                     Lavender Top[[id number]]                                     Final result                 Please view results for these tests on the individual orders.    Lavender Top [[id number]] Collected:  06/06/20 0935    Specimen:  Blood Updated:  06/06/20 1046     Extra Tube hold for add-on     Comment: Auto resulted       CK-MB [[id number]]  (Abnormal) Collected:  06/06/20 0716    Specimen:  Blood Updated:  06/06/20 0958     CKMB 22.49 ng/mL     Narrative:       Results may be falsely decreased if patient taking Biotin.      BNP [[id number]]  (Abnormal) Collected:  06/06/20 0716    Specimen:  Blood Updated:  06/06/20 0958     proBNP 2,934.0 pg/mL     Narrative:       Among patients with dyspnea, NT-proBNP is highly sensitive for the detection of acute congestive heart failure. In addition NT-proBNP of <300 pg/ml effectively rules out acute congestive heart failure with 99% negative predictive value.    Results may be falsely decreased if patient taking Biotin.      Hemoglobin A1c [[id number]]  (Abnormal) Collected:  06/06/20 0716    Specimen:  Blood Updated:  06/06/20 0937     Hemoglobin A1C 6.60 %     Narrative:       Hemoglobin A1C Ranges:    Increased Risk for Diabetes  5.7% to 6.4%  Diabetes                     >= 6.5%  Diabetic Goal                < 7.0%    Troponin [[id number]]  (Abnormal) Collected:  06/06/20 0716    Specimen:  Blood Updated:  06/06/20 0800     Troponin T 0.628 ng/mL     Narrative:       Troponin T Reference Range:  <= 0.03 ng/mL-   Negative for AMI  >0.03 ng/mL-     Abnormal for myocardial necrosis.  Clinicians would have to utilize clinical acumen, EKG, Troponin and serial changes to  determine if it is an Acute Myocardial Infarction or myocardial injury due to an underlying chronic condition.       Results may be falsely decreased if patient taking Biotin.      Lipid Panel [[id number]]  (Abnormal) Collected:  06/06/20 0716    Specimen:  Blood Updated:  06/06/20 0756     Total Cholesterol 222 mg/dL      Triglycerides 160 mg/dL      HDL Cholesterol 28 mg/dL      LDL Cholesterol  162 mg/dL      VLDL Cholesterol 32 mg/dL      LDL/HDL Ratio 5.79    Narrative:       Cholesterol Reference Ranges  (U.S. Department of Health and Human Services ATP III Classifications)    Desirable          <200 mg/dL  Borderline High    200-239 mg/dL  High Risk          >240 mg/dL      Triglyceride Reference Ranges  (U.S. Department of Health and Human Services ATP III Classifications)    Normal           <150 mg/dL  Borderline High  150-199 mg/dL  High             200-499 mg/dL  Very High        >500 mg/dL    HDL Reference Ranges  (U.S. Department of Health and Human Services ATP III Classifcations)    Low     <40 mg/dl (major risk factor for CHD)  High    >60 mg/dl ('negative' risk factor for CHD)        LDL Reference Ranges  (U.S. Department of Health and Human Services ATP III Classifcations)    Optimal          <100 mg/dL  Near Optimal     100-129 mg/dL  Borderline High  130-159 mg/dL  High             160-189 mg/dL  Very High        >189 mg/dL    Troponin [[id number]]  (Abnormal) Collected:  06/06/20 0208    Specimen:  Blood Updated:  06/06/20 0304     Troponin T 0.411 ng/mL     Narrative:       Troponin T Reference Range:  <= 0.03 ng/mL-   Negative for AMI  >0.03 ng/mL-     Abnormal for myocardial necrosis.  Clinicians would have to utilize clinical acumen, EKG, Troponin and serial changes to determine if it is an Acute Myocardial Infarction or myocardial injury due to an underlying chronic condition.       Results may be falsely decreased if patient taking Biotin.      COVID-19, [facility] IN-HOUSE, NP SWAB IN  "TRANSPORT MEDIA 8-10 HR TAT - Swab, Nasopharynx [[id number]]  (Normal) Collected:  06/05/20 1948    Specimen:  Swab from Nasopharynx Updated:  06/06/20 0149     COVID19 Not Detected    Narrative:       Testing performed by Real Time RT-PCR  This test has not been approved by the [facility] but is authorized under the Emergency Use Act (EAU)    https://www.fda.gov/media/824442/download    https://www.fda.gov/media/605137/download        Procalcitonin [[id number]]  (Normal) Collected:  06/05/20 2032    Specimen:  Blood Updated:  06/05/20 2228     Procalcitonin 0.11 ng/mL     Narrative:       As a Marker for Sepsis (Non-Neonates):   1. <0.5 ng/mL represents a low risk of severe sepsis and/or septic shock.  1. >2 ng/mL represents a high risk of severe sepsis and/or septic shock.    As a Marker for Lower Respiratory Tract Infections that require antibiotic therapy:  PCT on Admission     Antibiotic Therapy             6-12 Hrs later  > 0.5                Strongly Recommended            >0.25 - <0.5         Recommended  0.1 - 0.25           Discouraged                   Remeasure/reassess PCT  <0.1                 Strongly Discouraged          Remeasure/reassess PCT      As 28 day mortality risk marker: \"Change in Procalcitonin Result\" (> 80 % or <=80 %) if Day 0 (or Day 1) and Day 4 values are available. Refer to http://www.[employer]-pct-calculator.com/   Change in PCT <=80 %   A decrease of PCT levels below or equal to 80 % defines a positive change in PCT test result representing a higher risk for 28-day all-cause mortality of patients diagnosed with severe sepsis or septic shock.  Change in PCT > 80 %   A decrease of PCT levels of more than 80 % defines a negative change in PCT result representing a lower risk for 28-day all-cause mortality of patients diagnosed with severe sepsis or septic shock.                Results may be falsely decreased if patient taking Biotin.     Protime-INR [[id number]]  (Normal) Collected:  06/05/20 " 1749    Specimen:  Blood Updated:  06/05/20 2030     Protime 12.3 Seconds      INR 0.93    Narrative:       Therapeutic range for most indications is 2.0-3.0 INR,  or 2.5-3.5 for mechanical heart valves.    [place] Draw [[id number]] Collected:  06/05/20 1749    Specimen:  Blood Updated:  06/05/20 1901    Narrative:       The following orders were created for panel order [place] Draw.  Procedure                               Abnormality         Status                     ---------                               -----------         ------                     Light Blue Top[[id number]]                                                              Green Top (Gel)[[id number]]                                  Final result               Lavender Top[[id number]]                                     Final result               Gold Top - SST[[id number]]                                   Final result                 Please view results for these tests on the individual orders.    Green Top (Gel) [[id number]] Collected:  06/05/20 1749    Specimen:  Blood Updated:  06/05/20 1901     Extra Tube Hold for add-ons.     Comment: Auto resulted.       Lavender Top [[id number]] Collected:  06/05/20 1749    Specimen:  Blood Updated:  06/05/20 1901     Extra Tube hold for add-on     Comment: Auto resulted       Gold Top - SST [[id number]] Collected:  06/05/20 1749    Specimen:  Blood Updated:  06/05/20 1901     Extra Tube Hold for add-ons.     Comment: Auto resulted.       Lipase [[id number]]  (Normal) Collected:  06/05/20 1749    Specimen:  Blood Updated:  06/05/20 1842     Lipase 29 U/L     CK [[id number]]  (Normal) Collected:  06/05/20 1749    Specimen:  Blood Updated:  06/05/20 1842     Creatine Kinase 98 U/L     Magnesium [[id number]]  (Normal) Collected:  06/05/20 1749    Specimen:  Blood Updated:  06/05/20 1842     Magnesium 1.9 mg/dL     BNP [[id number]]  (Abnormal) Collected:  06/05/20 1749    Specimen:  Blood Updated:  06/05/20 1817     proBNP  2,023.0 pg/mL     Narrative:       Among patients with dyspnea, NT-proBNP is highly sensitive for the detection of acute congestive heart failure. In addition NT-proBNP of <300 pg/ml effectively rules out acute congestive heart failure with 99% negative predictive value.    Results may be falsely decreased if patient taking Biotin.          Imaging Results (Most Recent)     Procedure Component Value Units Date/Time    US Carotid Bilateral [[id number]] Collected:  06/09/20 0807     Updated:  06/09/20 0859    Narrative:       Doppler duplex carotid exam.         HISTORY:Atherosclerosis.  Carotid imaging prior to coronary  bypass surgery.    COMPARISON: None       TECHNIQUE: Grayscale, color Doppler and pulsed Doppler  interrogation of the common, internal and external carotid  arteries was performed with special attention to the internal  carotids. Limited evaluation of the vertebral and subclavian  arteries was also performed.    FINDINGS:     On the right, peak systolic internal carotid artery velocity  114.1 cm/s. ICA over CCA ratio 1.6. This suggest narrowing of  less than 50%.    On the left, peak systolic internal carotid artery velocity 127  cm/s. ICA over CCA ratio 2.1. This suggests narrowing in the  borderline range 50-60%.     Each vertebral artery has antegrade flow. There are mild  atherosclerotic changes, intimal thickening.      Impression:       Narrowing in the right carotid bifurcation, proximal internal  carotid artery less than 50%.    Areas of narrowing left carotid bifurcation, internal carotid  artery in the range of 50-60%.    Electronically signed by:  Jorge Martinez MD  6/9/2020 8:58 AM CDT  Workstation: [id number]    US Vein Mapping Bilateral [[id number]] Collected:  06/09/20 0807     Updated:  06/09/20 0857    Narrative:         PROCEDURE: Ultrasound, venous mapping of both lower extremities    COMPARISON: No comparison    HISTORY: Venous mapping prior to coronary artery bypass surgery    FINDINGS:      Right lower extremity:  Greater saphenous vein, proximal thigh: 3.69    mm  Greater saphenous vein, mid thigh: 4.01    mm  Greater saphenous vein, distal thigh: 2.89    mm  Greater saphenous vein, knee: 3.36    mm  Greater saphenous vein, proximal calf: 2.41    mm  Greater saphenous vein, mid calf: 2.24    mm  Greater saphenous vein, distal calf: 2.96    mm    Left lower extremity:  Greater saphenous vein, proximal thigh: 4.56    mm  Greater saphenous vein, mid thigh: 3.68    mm  Greater saphenous vein, distal thigh: 3.84    mm  Greater saphenous vein, knee: 3.45    mm  Greater saphenous vein, proximal calf: 3.76    mm  Greater saphenous vein, mid calf: 3.52    mm  Greater saphenous vein, distal calf: 2.32    mm      Impression:       1. Venous mapping study of both lower extremities  with measurements described above.    Electronically signed by:  Jorge Martinez MD  6/9/2020 8:55 AM CDT  Workstation: [id number]    US Renal Bilateral [[id number]] Collected:  06/06/20 1326     Updated:  06/06/20 1438    Narrative:       PROCEDURE: US RENAL BILATERAL    INDICATION:  CKD 3, DWIGHT     COMPARISON:  None    TECHNIQUE:  Ultrasound, renal    FINDINGS:    Kidney, right:      size:  Normal, measuring 12.2 x 5.8 x 5.2 cm    echotexture:  Normal    No nephrolithiasis, solid mass, or collecting system dilation    Kidney, left:      size:  Normal, measuring 12.0 x 6.3 x 5.2 cm    echotexture:  Normal    No nephrolithiasis, solid mass, or collecting system dilation.  Simple appearing renal cysts are present, including an exophytic  cyst which measures 3.9 x 3.4 x 4.0 cm. A smaller cyst at the  upper pole measures 2.8 x 1.6 x 1.5 cm.    Urinary bladder:  Grossly within normal limits    Misc:  The prostate protrudes into the base of the bladder and  appears likely prominent        Impression:       1. Normal appearance of the right kidney  2. Simple appearing left renal cysts  3. Prostate protrudes into the base of the bladder, and  is  probably at least somewhat enlarged.    Electronically signed by:  Vivienne Whittaker MD  6/6/2020 2:37 PM CDT  Workstation: [id number]    XR Chest 1 View [[id number]] Collected:  06/05/20 1809     Updated:  06/05/20 1845    Narrative:       PROCEDURE: XR CHEST 1 VW    VIEWS:Single    INDICATION: Chest pain    COMPARISON: None    FINDINGS:       - lines/tubes: None    - cardiac: Size within normal limits.  Mild aortic tortuosity. Contour within normal limits.     - lungs: Mild patchy ill-defined opacity in the right lung  base, may represent asymmetric edema or infiltrate.     - pleura: No evidence of  fluid.      - osseous: Unremarkable for age.      Impression:       Small patchy area of opacification right lung base, may represent  infiltrate or less likely asymmetric edema. Short-term interval  follow-up suggested to ensure complete resolution      Electronically signed by:  Vivienne Whittaker MD  6/5/2020 6:44 PM CDT  Workstation: [id number]          Chief Complaint on Day of Discharge: None    Hospital Course:  The patient is a 67 y.o. male who [facility] to [facility] with chest pain.  Cardiology was consulted.  Ranexa was added and patient was taken for left heart cath.  Nephrology was consulted due to acute kidney injury.  Echocardiogram was done and showed decreased ejection fraction of 31 to 35%.  Left heart catheterization was done and showed multivessel coronary artery disease.  CT surgery was consulted.  Patient was worked up for viability of coronary artery bypass grafting.  Plan was made for surgery on 18 June.  Pain resolved.  Renal function returned to what was felt to be normal.  Patient was monitored in the hospital to evaluate for home oxygen need.  Patient ambulated on room air was found to drop to 85%.  Home oxygen was arranged.  Patient was discharged home in good condition.    Condition on Discharge: Stable    Physical Exam on Discharge:  /58 (BP Location: Left arm, Patient  "Position: Lying)   Pulse 69   Temp 98.1 °F (36.7 °C) (Oral)   Resp 18   Ht 185.4 cm (73\")   Wt 112 kg (246 lb 6.4 oz)   SpO2 97%   BMI 32.51 kg/m²      Physical Exam   Constitutional: He appears well-developed and well-nourished.   HENT:   Head: Normocephalic and atraumatic.   Eyes: Pupils are equal, round, and reactive to light. EOM are normal.   Neck: Normal range of motion. Neck supple.   Cardiovascular: Normal rate, regular rhythm, normal heart sounds and intact distal pulses. Exam reveals no gallop and no friction rub.   No murmur heard.  Pulmonary/Chest: Effort normal and breath sounds normal. No respiratory distress. He has no wheezes. He has no rales. He exhibits no tenderness.   Abdominal: Soft. Bowel sounds are normal. He exhibits no distension. There is no tenderness.   Psychiatric: He has a normal mood and affect. His behavior is normal.   Vitals reviewed.        Discharge Disposition:  Home or Self Care    Discharge Medications:     Discharge Medications      New Medications      Instructions Start Date   atorvastatin 40 MG tablet  Commonly known as:  LIPITOR   40 mg, Oral, Nightly      isosorbide mononitrate 60 MG 24 hr tablet  Commonly known as:  IMDUR   60 mg, Oral, Every 24 Hours Scheduled   Start Date:  June 11, 2020     losartan 25 MG tablet  Commonly known as:  COZAAR   25 mg, Oral, Every 24 Hours Scheduled   Start Date:  June 11, 2020     metoprolol tartrate 25 MG tablet  Commonly known as:  LOPRESSOR   25 mg, Oral, Every 12 Hours Scheduled      ranolazine 1000 MG 12 hr tablet  Commonly known as:  RANEXA   1,000 mg, Oral, Every 12 Hours Scheduled         Continue These Medications      Instructions Start Date   albuterol sulfate  (90 Base) MCG/ACT inhaler  Commonly known as:  PROVENTIL HFA;VENTOLIN HFA;PROAIR HFA   2 puffs, Inhalation, Every 4 Hours PRN      aspirin 81 MG chewable tablet   81 mg, Oral, Daily      Breo Ellipta 100-25 MCG/INH inhaler  Generic drug:  Fluticasone " Furoate-Vilanterol   1 puff, Inhalation, Daily - RT         Stop These Medications    losartan-hydrochlorothiazide 100-12.5 MG per tablet  Commonly known as:  HYZAAR            Discharge Diet:   Diet Instructions     Diet: Cardiac      Discharge Diet:  Cardiac          Activity at Discharge:   Activity Instructions     Activity as Tolerated            Follow-up Appointments:   Coronary artery bypass grafting scheduled for June 18.    Test Results Pending at Discharge:    Order Current Status    Blood Culture - Blood, Arm, Left Preliminary result    Blood Culture - Blood, Arm, Left Preliminary result              This document has been electronically signed by Chapin Khan MD on Pau 10, 2020 14:40      Time: 35 minutes               5

## 2023-07-21 NOTE — DISCHARGE NOTE PROVIDER - NSDCFUADDAPPT_GEN_ALL_CORE_FT
Follow up with your surgeon in two weeks. Call for appointment.    If you need more pain medication, call your surgeon's office. For medication refills or authorizations, please call [phone] xt [phone]    We recommend that you call and schedule a follow up appointment with your primary care physician for repeat blood work (CBC and BMP) for post hospital discharge follow-up care 2-4 weeks after your surgery.     Make sure to have a bowel movement by 2 days after surgery. Take stool softeners and laxatives as needed.     Call your surgeon if you have increased redness/pain/drainage or fever. Return to ER for shortness of breath/calf tenderness.

## 2023-07-21 NOTE — OCCUPATIONAL THERAPY INITIAL EVALUATION ADULT - ADDITIONAL COMMENTS
Pre op assessment - Pt lives with , son and daughter (Who can assist post op) in a private house with 2-4 steps to enter with bilateral handrails (Far apart). Once inside, the pt has 1 step to a landing (Wide enough to fit a walker) + 10 steps with a L handrail to reach a platform and then 1 step to a platform (Wide enough to fit a walker) to reach the main floor where the bedroom and bathroom is. The pts bathroom has a walk in shower stall, fixed/retractable shower head, comfort height toilet seat and 2x grab bars.

## 2023-07-21 NOTE — DISCHARGE NOTE PROVIDER - HOSPITAL COURSE
63yFemale with history of osteoarthritis of left hip presenting for left anterior JONNA by Dr. Aston Gutierrez on 7/21/23. Risk and benefits of surgery were explained to the patient. The patient understood and agreed to proceed with surgery. Patient underwent the procedure with no intraoperative complications. Pt was brought in stable condition to the PACU. Once stable in PACU, pt was brought to the floor. During hospital stay pt was followed by Medicine,  during this admission. Pt hospital course was XX. Pt is stable for discharge to XX on POD# 63yFemale with history of osteoarthritis of left hip presenting for left anterior JONNA by Dr. Aston Gutierrez on 7/21/23. Risk and benefits of surgery were explained to the patient. The patient understood and agreed to proceed with surgery. Patient underwent the procedure with no intraoperative complications. Pt was brought in stable condition to the PACU. Once stable in PACU, pt was brought to the floor. During hospital stay pt was followed by Medicine,  during this admission. Pt hospital course was uneventful. Pt is stable for discharge to home on POD#1 63yFemale with history of osteoarthritis of left hip presenting for left anterior JONNA by Dr. Aston Gutierrez on 7/21/23. Risk and benefits of surgery were explained to the patient. The patient understood and agreed to proceed with surgery. Patient underwent the procedure with no intraoperative complications. Pt was brought in stable condition to the PACU. Once stable in PACU, pt was brought to the floor. During hospital stay pt was followed by Medicine,  during this admission. Pt hospital course was uneventful. Pt is stable for discharge to home on POD#2

## 2023-07-21 NOTE — CONSULT NOTE ADULT - SUBJECTIVE AND OBJECTIVE BOX
MARY RANDOLPH is a 63y Female s/p LEFT ANTERIOR TOTAL HIP ARTHROPLASTY WITH IMAGE      w/ h/o Breast Cancer    CABG (Coronary Artery Bypass Graft)    CABG (Coronary Artery Bypass Graft)    Lymph Edema    HTN (hypertension)    DM (diabetes mellitus)      denies any chest pain shortness of breath palpitation dizziness lightheadedness nausea vomiting fever or chills    S/P Bilateral Mastectomy    History of Bariatric Surgery    History of Abdominoplasty    H/O: Hysterectomy    Breast Augmentation    S/P Tonsillectomy    S/P Cholecystectomy    S/P cataract surgery    History of dental surgery    Status post angioplasty of vein        SH: doesnot smoke or drink at this time    castor oil (Anaphylaxis)    acetaminophen   IVPB .. 1000 milliGRAM(s) IV Intermittent once  ascorbic acid 500 milliGRAM(s) Oral two times a day  atorvastatin 20 milliGRAM(s) Oral at bedtime  ceFAZolin   IVPB 2000 milliGRAM(s) IV Intermittent every 8 hours  dextrose 5%. 1000 milliLiter(s) IV Continuous <Continuous>  dextrose 5%. 1000 milliLiter(s) IV Continuous <Continuous>  dextrose 50% Injectable 12.5 Gram(s) IV Push once  dextrose 50% Injectable 25 Gram(s) IV Push once  dextrose 50% Injectable 25 Gram(s) IV Push once  dextrose Oral Gel 15 Gram(s) Oral once PRN  glucagon  Injectable 1 milliGRAM(s) IntraMuscular once  HYDROmorphone  Injectable 0.5 milliGRAM(s) IV Push every 3 hours PRN  insulin lispro (ADMELOG) corrective regimen sliding scale   SubCutaneous three times a day before meals  lactated ringers. 1000 milliLiter(s) IV Continuous <Continuous>  losartan 100 milliGRAM(s) Oral daily  melatonin 3 milliGRAM(s) Oral at bedtime PRN  metFORMIN 500 milliGRAM(s) Oral three times a day  multivitamin 1 Tablet(s) Oral daily  ondansetron Injectable 4 milliGRAM(s) IV Push every 6 hours PRN  oxyCODONE    IR 5 milliGRAM(s) Oral every 4 hours PRN  oxyCODONE    IR 10 milliGRAM(s) Oral every 4 hours PRN  pantoprazole    Tablet 40 milliGRAM(s) Oral before breakfast  polyethylene glycol 3350 17 Gram(s) Oral at bedtime  senna 2 Tablet(s) Oral at bedtime    T(C): 36.7 (07-21-23 @ 15:46), Max: 37 (07-21-23 @ 14:47)  HR: 80 (07-21-23 @ 15:46) (68 - 85)  BP: 133/74 (07-21-23 @ 15:46) (98/44 - 142/78)  RR: 16 (07-21-23 @ 15:46) (10 - 17)  SpO2: 96% (07-21-23 @ 15:46) (95% - 97%)  HEENT unremarkable  neck no JVD or bruit  heart normal S1 S2 RRR no gallops or rubs  chest clear to auscultation  abd sof nontender non distended +bs  ext no calf tenderness    A/P   DVT PX  pain control  bowel regimen   wound care as per ortho  GI PX  antiemetics prn  incentive spirometer

## 2023-07-22 ENCOUNTER — TRANSCRIPTION ENCOUNTER (OUTPATIENT)
Age: 63
End: 2023-07-22

## 2023-07-22 LAB
ANION GAP SERPL CALC-SCNC: 4 MMOL/L — LOW (ref 5–17)
ANION GAP SERPL CALC-SCNC: 4 MMOL/L — LOW (ref 5–17)
BUN SERPL-MCNC: 14 MG/DL — SIGNIFICANT CHANGE UP (ref 7–23)
BUN SERPL-MCNC: 18 MG/DL — SIGNIFICANT CHANGE UP (ref 7–23)
CALCIUM SERPL-MCNC: 8.2 MG/DL — LOW (ref 8.5–10.1)
CALCIUM SERPL-MCNC: 8.9 MG/DL — SIGNIFICANT CHANGE UP (ref 8.5–10.1)
CHLORIDE SERPL-SCNC: 100 MMOL/L — SIGNIFICANT CHANGE UP (ref 96–108)
CHLORIDE SERPL-SCNC: 93 MMOL/L — LOW (ref 96–108)
CO2 SERPL-SCNC: 25 MMOL/L — SIGNIFICANT CHANGE UP (ref 22–31)
CO2 SERPL-SCNC: 25 MMOL/L — SIGNIFICANT CHANGE UP (ref 22–31)
CREAT SERPL-MCNC: 0.79 MG/DL — SIGNIFICANT CHANGE UP (ref 0.5–1.3)
CREAT SERPL-MCNC: 0.84 MG/DL — SIGNIFICANT CHANGE UP (ref 0.5–1.3)
EGFR: 77 ML/MIN/1.73M2 — SIGNIFICANT CHANGE UP
EGFR: 83 ML/MIN/1.73M2 — SIGNIFICANT CHANGE UP
GLUCOSE SERPL-MCNC: 220 MG/DL — HIGH (ref 70–99)
GLUCOSE SERPL-MCNC: 432 MG/DL — HIGH (ref 70–99)
HCT VFR BLD CALC: 25.3 % — LOW (ref 34.5–45)
HCT VFR BLD CALC: 27.9 % — LOW (ref 34.5–45)
HGB BLD-MCNC: 8.6 G/DL — LOW (ref 11.5–15.5)
HGB BLD-MCNC: 9.4 G/DL — LOW (ref 11.5–15.5)
MCHC RBC-ENTMCNC: 28.2 PG — SIGNIFICANT CHANGE UP (ref 27–34)
MCHC RBC-ENTMCNC: 28.6 PG — SIGNIFICANT CHANGE UP (ref 27–34)
MCHC RBC-ENTMCNC: 33.7 G/DL — SIGNIFICANT CHANGE UP (ref 32–36)
MCHC RBC-ENTMCNC: 34 G/DL — SIGNIFICANT CHANGE UP (ref 32–36)
MCV RBC AUTO: 83.8 FL — SIGNIFICANT CHANGE UP (ref 80–100)
MCV RBC AUTO: 84.1 FL — SIGNIFICANT CHANGE UP (ref 80–100)
NRBC # BLD: 0 /100 WBCS — SIGNIFICANT CHANGE UP (ref 0–0)
NRBC # BLD: 0 /100 WBCS — SIGNIFICANT CHANGE UP (ref 0–0)
PLATELET # BLD AUTO: 205 K/UL — SIGNIFICANT CHANGE UP (ref 150–400)
PLATELET # BLD AUTO: 207 K/UL — SIGNIFICANT CHANGE UP (ref 150–400)
POTASSIUM SERPL-MCNC: 5.1 MMOL/L — SIGNIFICANT CHANGE UP (ref 3.5–5.3)
POTASSIUM SERPL-MCNC: 5.7 MMOL/L — HIGH (ref 3.5–5.3)
POTASSIUM SERPL-SCNC: 5.1 MMOL/L — SIGNIFICANT CHANGE UP (ref 3.5–5.3)
POTASSIUM SERPL-SCNC: 5.7 MMOL/L — HIGH (ref 3.5–5.3)
RBC # BLD: 3.01 M/UL — LOW (ref 3.8–5.2)
RBC # BLD: 3.33 M/UL — LOW (ref 3.8–5.2)
RBC # FLD: 13 % — SIGNIFICANT CHANGE UP (ref 10.3–14.5)
RBC # FLD: 13 % — SIGNIFICANT CHANGE UP (ref 10.3–14.5)
SODIUM SERPL-SCNC: 122 MMOL/L — LOW (ref 135–145)
SODIUM SERPL-SCNC: 129 MMOL/L — LOW (ref 135–145)
WBC # BLD: 7.88 K/UL — SIGNIFICANT CHANGE UP (ref 3.8–10.5)
WBC # BLD: 9.65 K/UL — SIGNIFICANT CHANGE UP (ref 3.8–10.5)
WBC # FLD AUTO: 7.88 K/UL — SIGNIFICANT CHANGE UP (ref 3.8–10.5)
WBC # FLD AUTO: 9.65 K/UL — SIGNIFICANT CHANGE UP (ref 3.8–10.5)

## 2023-07-22 RX ORDER — SODIUM CHLORIDE 9 MG/ML
1000 INJECTION INTRAMUSCULAR; INTRAVENOUS; SUBCUTANEOUS
Refills: 0 | Status: DISCONTINUED | OUTPATIENT
Start: 2023-07-22 | End: 2023-07-23

## 2023-07-22 RX ORDER — PANTOPRAZOLE SODIUM 20 MG/1
1 TABLET, DELAYED RELEASE ORAL
Qty: 21 | Refills: 0
Start: 2023-07-22 | End: 2023-08-11

## 2023-07-22 RX ORDER — ACETAMINOPHEN 500 MG
1000 TABLET ORAL ONCE
Refills: 0 | Status: COMPLETED | OUTPATIENT
Start: 2023-07-22 | End: 2023-07-22

## 2023-07-22 RX ORDER — SODIUM ZIRCONIUM CYCLOSILICATE 10 G/10G
5 POWDER, FOR SUSPENSION ORAL ONCE
Refills: 0 | Status: COMPLETED | OUTPATIENT
Start: 2023-07-22 | End: 2023-07-22

## 2023-07-22 RX ADMIN — Medication 81 MILLIGRAM(S): at 05:03

## 2023-07-22 RX ADMIN — Medication 102 MILLIGRAM(S): at 05:02

## 2023-07-22 RX ADMIN — SODIUM CHLORIDE 75 MILLILITER(S): 9 INJECTION INTRAMUSCULAR; INTRAVENOUS; SUBCUTANEOUS at 14:38

## 2023-07-22 RX ADMIN — Medication 500 MILLIGRAM(S): at 17:17

## 2023-07-22 RX ADMIN — SENNA PLUS 2 TABLET(S): 8.6 TABLET ORAL at 21:38

## 2023-07-22 RX ADMIN — Medication 4: at 11:49

## 2023-07-22 RX ADMIN — OXYCODONE HYDROCHLORIDE 10 MILLIGRAM(S): 5 TABLET ORAL at 05:02

## 2023-07-22 RX ADMIN — CELECOXIB 200 MILLIGRAM(S): 200 CAPSULE ORAL at 05:03

## 2023-07-22 RX ADMIN — OXYCODONE HYDROCHLORIDE 10 MILLIGRAM(S): 5 TABLET ORAL at 19:32

## 2023-07-22 RX ADMIN — CELECOXIB 200 MILLIGRAM(S): 200 CAPSULE ORAL at 06:00

## 2023-07-22 RX ADMIN — Medication 1 TABLET(S): at 11:49

## 2023-07-22 RX ADMIN — CELECOXIB 200 MILLIGRAM(S): 200 CAPSULE ORAL at 17:17

## 2023-07-22 RX ADMIN — METFORMIN HYDROCHLORIDE 500 MILLIGRAM(S): 850 TABLET ORAL at 05:03

## 2023-07-22 RX ADMIN — POLYETHYLENE GLYCOL 3350 17 GRAM(S): 17 POWDER, FOR SOLUTION ORAL at 21:38

## 2023-07-22 RX ADMIN — OXYCODONE HYDROCHLORIDE 10 MILLIGRAM(S): 5 TABLET ORAL at 18:35

## 2023-07-22 RX ADMIN — Medication 500 MILLIGRAM(S): at 05:03

## 2023-07-22 RX ADMIN — METFORMIN HYDROCHLORIDE 500 MILLIGRAM(S): 850 TABLET ORAL at 21:38

## 2023-07-22 RX ADMIN — Medication 100 MILLIGRAM(S): at 00:28

## 2023-07-22 RX ADMIN — Medication 400 MILLIGRAM(S): at 17:17

## 2023-07-22 RX ADMIN — OXYCODONE HYDROCHLORIDE 10 MILLIGRAM(S): 5 TABLET ORAL at 06:03

## 2023-07-22 RX ADMIN — ATORVASTATIN CALCIUM 20 MILLIGRAM(S): 80 TABLET, FILM COATED ORAL at 21:38

## 2023-07-22 RX ADMIN — SODIUM ZIRCONIUM CYCLOSILICATE 5 GRAM(S): 10 POWDER, FOR SUSPENSION ORAL at 14:38

## 2023-07-22 RX ADMIN — PANTOPRAZOLE SODIUM 40 MILLIGRAM(S): 20 TABLET, DELAYED RELEASE ORAL at 05:03

## 2023-07-22 RX ADMIN — Medication 2: at 08:14

## 2023-07-22 RX ADMIN — Medication 2: at 16:37

## 2023-07-22 RX ADMIN — Medication 1000 MILLIGRAM(S): at 18:35

## 2023-07-22 RX ADMIN — Medication 81 MILLIGRAM(S): at 17:17

## 2023-07-22 RX ADMIN — LOSARTAN POTASSIUM 100 MILLIGRAM(S): 100 TABLET, FILM COATED ORAL at 05:03

## 2023-07-22 RX ADMIN — CELECOXIB 200 MILLIGRAM(S): 200 CAPSULE ORAL at 18:35

## 2023-07-22 NOTE — DISCHARGE NOTE NURSING/CASE MANAGEMENT/SOCIAL WORK - PATIENT PORTAL LINK FT
You can access the FollowMyHealth Patient Portal offered by [facility] by registering at the following website: [email or web address]. By joining [employer]s FollowMyHealth portal, you will also be able to view your health information using other applications (apps) compatible with our system.

## 2023-07-22 NOTE — DISCHARGE NOTE NURSING/CASE MANAGEMENT/SOCIAL WORK - NSDCFUADDAPPT_GEN_ALL_CORE_FT
Follow up with your surgeon in two weeks. Call for appointment.    If you need more pain medication, call your surgeon's office. For medication refills or authorizations, please call [phone]    We recommend that you call and schedule a follow up appointment with your primary care physician for repeat blood work (CBC and BMP) for post hospital discharge follow-up care 2-4 weeks after your surgery.     Make sure to have a bowel movement by 2 days after surgery. Take stool softeners and laxatives as needed.     Call your surgeon if you have increased redness/pain/drainage or fever. Return to ER for shortness of breath/calf tenderness.

## 2023-07-22 NOTE — CHART NOTE - NSCHARTNOTEFT_GEN_A_CORE
Discussed hyponatremia and hyperkalemia with Dr. Eric. Correct Na is 132 and recommendation is gentle IVF with normal saline. One dose lokelma given for hyperK. Will follow up repeat BMP in AM.

## 2023-07-22 NOTE — PROGRESS NOTE ADULT - SUBJECTIVE AND OBJECTIVE BOX
Patient seen and examined at bedside. Pain is controlled. Pt feeling well. No nausea or vomiting.    Vital Signs Last 24 Hrs  T(C): 36.6 (07-21-23 @ 23:30), Max: 37 (07-21-23 @ 14:47)  T(F): 97.9 (07-21-23 @ 23:30), Max: 98.6 (07-21-23 @ 14:47)  HR: 71 (07-21-23 @ 23:30) (68 - 85)  BP: 104/58 (07-21-23 @ 23:30) (98/44 - 142/78)  BP(mean): --  RR: 17 (07-21-23 @ 23:30) (10 - 17)  SpO2: 95% (07-21-23 @ 23:30) (95% - 97%)          Exam:  Gen: NAD, resting comfortably  LLE  Dressing c/d/i  +EHL/FHL/TA/GS  SILT dp/sp/saph/sural/tibial  +DP/PT 2+  Calf NTTP b/l  Compartments soft and compressible    A/P:  63yFemale Stable POD1 s/p L anterior JONNA    -FU labs  -WBAT  -Pain control  -PT/OT  -Ppx ABX x 2 doses  -DVT PE ppx- hold until POD1 aspirin  -Incentive spirometry  -DIspo: Home

## 2023-07-23 VITALS
OXYGEN SATURATION: 95 % | TEMPERATURE: 98 F | DIASTOLIC BLOOD PRESSURE: 75 MMHG | RESPIRATION RATE: 16 BRPM | SYSTOLIC BLOOD PRESSURE: 125 MMHG | HEART RATE: 62 BPM

## 2023-07-23 LAB
ANION GAP SERPL CALC-SCNC: 5 MMOL/L — SIGNIFICANT CHANGE UP (ref 5–17)
BUN SERPL-MCNC: 14 MG/DL — SIGNIFICANT CHANGE UP (ref 7–23)
CALCIUM SERPL-MCNC: 8.9 MG/DL — SIGNIFICANT CHANGE UP (ref 8.5–10.1)
CHLORIDE SERPL-SCNC: 102 MMOL/L — SIGNIFICANT CHANGE UP (ref 96–108)
CO2 SERPL-SCNC: 26 MMOL/L — SIGNIFICANT CHANGE UP (ref 22–31)
CREAT SERPL-MCNC: 0.66 MG/DL — SIGNIFICANT CHANGE UP (ref 0.5–1.3)
EGFR: 97 ML/MIN/1.73M2 — SIGNIFICANT CHANGE UP
GLUCOSE SERPL-MCNC: 135 MG/DL — HIGH (ref 70–99)
HCT VFR BLD CALC: 26.4 % — LOW (ref 34.5–45)
HGB BLD-MCNC: 9 G/DL — LOW (ref 11.5–15.5)
MCHC RBC-ENTMCNC: 28.7 PG — SIGNIFICANT CHANGE UP (ref 27–34)
MCHC RBC-ENTMCNC: 34.1 G/DL — SIGNIFICANT CHANGE UP (ref 32–36)
MCV RBC AUTO: 84.1 FL — SIGNIFICANT CHANGE UP (ref 80–100)
NRBC # BLD: 0 /100 WBCS — SIGNIFICANT CHANGE UP (ref 0–0)
PLATELET # BLD AUTO: 217 K/UL — SIGNIFICANT CHANGE UP (ref 150–400)
POTASSIUM SERPL-MCNC: 4.8 MMOL/L — SIGNIFICANT CHANGE UP (ref 3.5–5.3)
POTASSIUM SERPL-SCNC: 4.8 MMOL/L — SIGNIFICANT CHANGE UP (ref 3.5–5.3)
RBC # BLD: 3.14 M/UL — LOW (ref 3.8–5.2)
RBC # FLD: 13.2 % — SIGNIFICANT CHANGE UP (ref 10.3–14.5)
SODIUM SERPL-SCNC: 133 MMOL/L — LOW (ref 135–145)
WBC # BLD: 7.35 K/UL — SIGNIFICANT CHANGE UP (ref 3.8–10.5)
WBC # FLD AUTO: 7.35 K/UL — SIGNIFICANT CHANGE UP (ref 3.8–10.5)

## 2023-07-23 RX ORDER — ACETAMINOPHEN 500 MG
1000 TABLET ORAL ONCE
Refills: 0 | Status: COMPLETED | OUTPATIENT
Start: 2023-07-23 | End: 2023-07-23

## 2023-07-23 RX ORDER — OXYCODONE HYDROCHLORIDE 5 MG/1
1 TABLET ORAL
Qty: 42 | Refills: 0
Start: 2023-07-23 | End: 2023-07-29

## 2023-07-23 RX ORDER — OXYCODONE HYDROCHLORIDE 5 MG/1
1 TABLET ORAL
Qty: 30 | Refills: 0
Start: 2023-07-23 | End: 2023-07-27

## 2023-07-23 RX ADMIN — Medication 81 MILLIGRAM(S): at 05:39

## 2023-07-23 RX ADMIN — PANTOPRAZOLE SODIUM 40 MILLIGRAM(S): 20 TABLET, DELAYED RELEASE ORAL at 05:38

## 2023-07-23 RX ADMIN — Medication 1000 MILLIGRAM(S): at 11:29

## 2023-07-23 RX ADMIN — Medication 400 MILLIGRAM(S): at 10:59

## 2023-07-23 RX ADMIN — LOSARTAN POTASSIUM 100 MILLIGRAM(S): 100 TABLET, FILM COATED ORAL at 05:38

## 2023-07-23 RX ADMIN — CELECOXIB 200 MILLIGRAM(S): 200 CAPSULE ORAL at 05:39

## 2023-07-23 RX ADMIN — METFORMIN HYDROCHLORIDE 500 MILLIGRAM(S): 850 TABLET ORAL at 05:39

## 2023-07-23 RX ADMIN — Medication 500 MILLIGRAM(S): at 05:39

## 2023-07-23 RX ADMIN — Medication 1 TABLET(S): at 11:22

## 2023-07-23 NOTE — PROGRESS NOTE ADULT - SUBJECTIVE AND OBJECTIVE BOX
MARY RANDOLPH is a 63y Female s/p LEFT ANTERIOR TOTAL HIP ARTHROPLASTY WITH IMAGE        denies any chest pain shortness of breath palpitation dizziness lightheadedness nausea vomiting fever or chills    T(C): 36.8 (07-23-23 @ 09:02), Max: 36.9 (07-22-23 @ 14:55)  HR: 81 (07-23-23 @ 09:02) (67 - 81)  BP: 142/81 (07-23-23 @ 09:02) (121/73 - 142/81)  RR: 16 (07-23-23 @ 09:02) (16 - 18)  SpO2: 99% (07-23-23 @ 09:02) (96% - 100%)  no jvd/bruit  s1 s2 rrr  cta  s/nt/nd  no calf tend                        9.0    7.35  )-----------( 217      ( 23 Jul 2023 07:01 )             26.4   07-23    133<L>  |  102  |  14  ----------------------------<  135<H>  4.8   |  26  |  0.66    Ca    8.9      23 Jul 2023 07:01        cont dvt px  pain control  bowel regimen  antiemetics  incentive spirometer

## 2023-07-23 NOTE — PROGRESS NOTE ADULT - SUBJECTIVE AND OBJECTIVE BOX
Patient seen and examined at bedside. Pain is controlled. Pt feeling well. No nausea or vomiting.      LABS:                        9.0    7.35  )-----------( 217      ( 23 Jul 2023 07:01 )             26.4     07-22    129<L>  |  100  |  18  ----------------------------<  220<H>  5.7<H>   |  25  |  0.84    Ca    8.9      22 Jul 2023 10:20        Urinalysis Basic - ( 22 Jul 2023 10:20 )    Color: x / Appearance: x / SG: x / pH: x  Gluc: 220 mg/dL / Ketone: x  / Bili: x / Urobili: x   Blood: x / Protein: x / Nitrite: x   Leuk Esterase: x / RBC: x / WBC x   Sq Epi: x / Non Sq Epi: x / Bacteria: x        VITAL SIGNS:  T(C): 36.7 (07-23-23 @ 05:00), Max: 36.9 (07-22-23 @ 14:55)  HR: 72 (07-23-23 @ 05:00) (67 - 76)  BP: 123/57 (07-23-23 @ 05:00) (121/73 - 141/70)  RR: 17 (07-23-23 @ 05:00) (17 - 18)  SpO2: 97% (07-23-23 @ 05:00) (96% - 100%)        Exam:  Gen: NAD, resting comfortably  LLE  Dressing c/d/i  +EHL/FHL/TA/GS/Q/H  SILT dp/sp/saph/sural/tibial  +DP/PT  Calf NTTP b/l  Compartments soft and compressible    A/P:  63yFemale Stable POD2 s/p L anterior JONNA    -FU AM labs  -WBAT  -Pain control  -PT/OT  -DVT PE ppx-  aspirin  -Incentive spirometry  -DIspo: Home pending AM BMP results

## 2023-07-26 DIAGNOSIS — E11.9 TYPE 2 DIABETES MELLITUS WITHOUT COMPLICATIONS: ICD-10-CM

## 2023-07-26 DIAGNOSIS — Z85.3 PERSONAL HISTORY OF MALIGNANT NEOPLASM OF BREAST: ICD-10-CM

## 2023-07-26 DIAGNOSIS — E66.9 OBESITY, UNSPECIFIED: ICD-10-CM

## 2023-07-26 DIAGNOSIS — M16.12 UNILATERAL PRIMARY OSTEOARTHRITIS, LEFT HIP: ICD-10-CM

## 2023-07-26 DIAGNOSIS — Z95.1 PRESENCE OF AORTOCORONARY BYPASS GRAFT: ICD-10-CM

## 2023-07-26 DIAGNOSIS — Z79.84 LONG TERM (CURRENT) USE OF ORAL HYPOGLYCEMIC DRUGS: ICD-10-CM

## 2023-07-26 DIAGNOSIS — Z90.13 ACQUIRED ABSENCE OF BILATERAL BREASTS AND NIPPLES: ICD-10-CM

## 2023-07-26 DIAGNOSIS — E87.1 HYPO-OSMOLALITY AND HYPONATREMIA: ICD-10-CM

## 2023-07-26 DIAGNOSIS — Z98.84 BARIATRIC SURGERY STATUS: ICD-10-CM

## 2023-07-26 DIAGNOSIS — E87.5 HYPERKALEMIA: ICD-10-CM

## 2023-07-26 DIAGNOSIS — Z90.710 ACQUIRED ABSENCE OF BOTH CERVIX AND UTERUS: ICD-10-CM

## 2023-07-26 DIAGNOSIS — I10 ESSENTIAL (PRIMARY) HYPERTENSION: ICD-10-CM

## 2023-08-07 ENCOUNTER — APPOINTMENT (OUTPATIENT)
Dept: ORTHOPEDIC SURGERY | Facility: CLINIC | Age: 63
End: 2023-08-07
Payer: MEDICARE

## 2023-08-07 VITALS — BODY MASS INDEX: 39.29 KG/M2 | HEIGHT: 62.5 IN | WEIGHT: 219 LBS

## 2023-08-07 PROCEDURE — 73503 X-RAY EXAM HIP UNI 4/> VIEWS: CPT | Mod: LT

## 2023-08-07 PROCEDURE — 99024 POSTOP FOLLOW-UP VISIT: CPT

## 2023-08-07 NOTE — HISTORY OF PRESENT ILLNESS
[Dull/Aching] : dull/aching [Sharp] : sharp [10] : 10 [5] : 5 [Constant] : constant [Household chores] : household chores [Leisure] : leisure [Work] : work [Sleep] : sleep [Meds] : meds [Heat] : heat [Walking] : walking [Part time] : Work status: part time [de-identified] : 6/26/23: 64 yo f here for the evaluation of her left hip;  Pain to the left groin worsening since 2/2023 after she went on a cruise. Pain worse with stairs and steps. Unable to tie her shoes and is having difficulty bending. There is difficulty sleeping. The weather effects the pain. She has been taking arthritis medications OTC and rapid release tylenol.   Pmhx: Rou-en-y procedure; Breast cancer hx with double mastectomy with maximum chemo/radiation; DM HGA1C? Lympedema  8/7/23: 1st post op s/p L hip. Doing well. Ambulating with a cane.  [] : This patient has had an injection before: no [FreeTextEntry1] : Left hip [FreeTextEntry7] : sometimes to her toes [de-identified] : movement [de-identified] : xrays [de-identified] : none  [de-identified] : 7/21/23

## 2023-08-07 NOTE — ASSESSMENT
[FreeTextEntry1] : 63F p/w adv L hip OA 2 weeks s/p L JONNA  Transition to OP PT Continue ASA X 2 weeks Follow up at 6 week tyson  We discussed my findings and the natural history of their condition. We talked about the details of the proposed surgery and the recovery. We discussed the material risks, possible benefits and alternatives to surgery. The risks include but are not limited to infection, bleeding and possible need for blood transfusion, fracture, bowel blockage, bladder retention or infection, need for reoperation, stiffness and/or limited range of motion, possible damage to nerves and blood vessels, failure of fixation of components, risk of deep vein thromboses and pulmonary embolism, wound healing problems, dislocation, and possible leg length discrepancy. Although incredibly rare, we also discussed the risks of a cardiac event, stroke and even death during, or following, the surgery. We discussed the type of implants the patient will be receiving and the type of fixation that will be used, as well as whether a robot or computer navigation aide will be used. The patient understands they will need medical clearance and will attend a preoperative joint education class. We also discussed the type of anesthesia they will receive, and the risks associated with hospital or rehab length of stay, obesity, diabetes and smoking.

## 2023-08-09 ENCOUNTER — TRANSCRIPTION ENCOUNTER (OUTPATIENT)
Age: 63
End: 2023-08-09

## 2023-08-10 ENCOUNTER — TRANSCRIPTION ENCOUNTER (OUTPATIENT)
Age: 63
End: 2023-08-10

## 2023-09-11 ENCOUNTER — APPOINTMENT (OUTPATIENT)
Dept: ORTHOPEDIC SURGERY | Facility: CLINIC | Age: 63
End: 2023-09-11
Payer: MEDICARE

## 2023-09-11 VITALS — WEIGHT: 219 LBS | HEIGHT: 62.5 IN | BODY MASS INDEX: 39.29 KG/M2

## 2023-09-11 PROCEDURE — 99024 POSTOP FOLLOW-UP VISIT: CPT

## 2023-09-19 ENCOUNTER — RESULT CHARGE (OUTPATIENT)
Age: 63
End: 2023-09-19

## 2023-09-19 ENCOUNTER — APPOINTMENT (OUTPATIENT)
Dept: ORTHOPEDIC SURGERY | Facility: CLINIC | Age: 63
End: 2023-09-19
Payer: MEDICARE

## 2023-09-19 VITALS — HEIGHT: 62 IN | BODY MASS INDEX: 40.3 KG/M2 | WEIGHT: 219 LBS

## 2023-09-19 PROCEDURE — 72100 X-RAY EXAM L-S SPINE 2/3 VWS: CPT

## 2023-09-19 PROCEDURE — 99214 OFFICE O/P EST MOD 30 MIN: CPT

## 2023-09-19 RX ORDER — TIZANIDINE HYDROCHLORIDE 4 MG/1
4 CAPSULE ORAL
Qty: 60 | Refills: 3 | Status: ACTIVE | COMMUNITY
Start: 2023-09-19 | End: 1900-01-01

## 2023-09-23 ENCOUNTER — APPOINTMENT (OUTPATIENT)
Dept: ORTHOPEDIC SURGERY | Facility: CLINIC | Age: 63
End: 2023-09-23
Payer: MEDICARE

## 2023-09-23 VITALS — WEIGHT: 219 LBS | BODY MASS INDEX: 40.3 KG/M2 | HEIGHT: 62 IN

## 2023-09-23 PROCEDURE — 99213 OFFICE O/P EST LOW 20 MIN: CPT

## 2023-11-07 ENCOUNTER — APPOINTMENT (OUTPATIENT)
Dept: ORTHOPEDIC SURGERY | Facility: CLINIC | Age: 63
End: 2023-11-07
Payer: MEDICARE

## 2023-11-07 VITALS — HEIGHT: 62 IN | WEIGHT: 219 LBS | BODY MASS INDEX: 40.3 KG/M2

## 2023-11-07 PROCEDURE — 20611 DRAIN/INJ JOINT/BURSA W/US: CPT | Mod: RT

## 2023-11-07 PROCEDURE — 99213 OFFICE O/P EST LOW 20 MIN: CPT | Mod: 25

## 2023-11-13 ENCOUNTER — APPOINTMENT (OUTPATIENT)
Dept: ORTHOPEDIC SURGERY | Facility: CLINIC | Age: 63
End: 2023-11-13

## 2023-11-14 ENCOUNTER — APPOINTMENT (OUTPATIENT)
Dept: ORTHOPEDIC SURGERY | Facility: CLINIC | Age: 63
End: 2023-11-14
Payer: MEDICARE

## 2023-11-14 VITALS — WEIGHT: 219 LBS | BODY MASS INDEX: 40.3 KG/M2 | HEIGHT: 62 IN

## 2023-11-14 PROCEDURE — 20611 DRAIN/INJ JOINT/BURSA W/US: CPT | Mod: RT

## 2023-11-20 ENCOUNTER — APPOINTMENT (OUTPATIENT)
Dept: ORTHOPEDIC SURGERY | Facility: CLINIC | Age: 63
End: 2023-11-20
Payer: MEDICARE

## 2023-11-20 VITALS — BODY MASS INDEX: 40.3 KG/M2 | WEIGHT: 219 LBS | HEIGHT: 62 IN

## 2023-11-20 PROCEDURE — 99213 OFFICE O/P EST LOW 20 MIN: CPT

## 2023-11-20 PROCEDURE — 73502 X-RAY EXAM HIP UNI 2-3 VIEWS: CPT

## 2023-11-21 ENCOUNTER — APPOINTMENT (OUTPATIENT)
Dept: ORTHOPEDIC SURGERY | Facility: CLINIC | Age: 63
End: 2023-11-21
Payer: MEDICARE

## 2023-11-21 VITALS — HEIGHT: 62 IN | BODY MASS INDEX: 40.3 KG/M2 | WEIGHT: 219 LBS

## 2023-11-21 PROCEDURE — 20611 DRAIN/INJ JOINT/BURSA W/US: CPT | Mod: RT

## 2023-12-04 ENCOUNTER — APPOINTMENT (OUTPATIENT)
Dept: ORTHOPEDIC SURGERY | Facility: CLINIC | Age: 63
End: 2023-12-04
Payer: MEDICARE

## 2023-12-04 VITALS — WEIGHT: 219 LBS | BODY MASS INDEX: 40.3 KG/M2 | HEIGHT: 62 IN

## 2023-12-04 DIAGNOSIS — G89.29 LOW BACK PAIN, UNSPECIFIED: ICD-10-CM

## 2023-12-04 DIAGNOSIS — M54.50 LOW BACK PAIN, UNSPECIFIED: ICD-10-CM

## 2023-12-04 DIAGNOSIS — M51.36 OTHER INTERVERTEBRAL DISC DEGENERATION, LUMBAR REGION: ICD-10-CM

## 2023-12-04 PROCEDURE — 99213 OFFICE O/P EST LOW 20 MIN: CPT

## 2023-12-05 ENCOUNTER — APPOINTMENT (OUTPATIENT)
Dept: ORTHOPEDIC SURGERY | Facility: CLINIC | Age: 63
End: 2023-12-05
Payer: MEDICARE

## 2023-12-05 VITALS — WEIGHT: 219 LBS | BODY MASS INDEX: 40.3 KG/M2 | HEIGHT: 62 IN

## 2023-12-05 VITALS — HEIGHT: 62 IN | BODY MASS INDEX: 40.3 KG/M2 | WEIGHT: 219 LBS

## 2023-12-05 PROCEDURE — 20611 DRAIN/INJ JOINT/BURSA W/US: CPT | Mod: LT

## 2023-12-05 PROCEDURE — 99213 OFFICE O/P EST LOW 20 MIN: CPT | Mod: 25

## 2023-12-05 RX ORDER — MELOXICAM 15 MG/1
15 TABLET ORAL
Qty: 30 | Refills: 0 | Status: ACTIVE | COMMUNITY
Start: 2023-12-05 | End: 1900-01-01

## 2023-12-12 ENCOUNTER — APPOINTMENT (OUTPATIENT)
Dept: ORTHOPEDIC SURGERY | Facility: CLINIC | Age: 63
End: 2023-12-12
Payer: MEDICARE

## 2023-12-12 VITALS — BODY MASS INDEX: 40.3 KG/M2 | WEIGHT: 219 LBS | HEIGHT: 62 IN

## 2023-12-12 PROCEDURE — 20611 DRAIN/INJ JOINT/BURSA W/US: CPT | Mod: LT

## 2023-12-13 NOTE — ED ADULT NURSE NOTE - NSSISCREENINGQ2_ED_A_ED
Order acknowledged and chart reviewed. Spoke with Physical Therapist, pt is able to dress self and adhere to cervical precautions. No further skilled acute OT needed. Will complete order. No

## 2023-12-19 ENCOUNTER — APPOINTMENT (OUTPATIENT)
Dept: ORTHOPEDIC SURGERY | Facility: CLINIC | Age: 63
End: 2023-12-19
Payer: MEDICARE

## 2023-12-19 VITALS — HEIGHT: 62 IN | BODY MASS INDEX: 40.3 KG/M2 | WEIGHT: 219 LBS

## 2023-12-19 PROCEDURE — 20611 DRAIN/INJ JOINT/BURSA W/US: CPT | Mod: 50

## 2023-12-19 NOTE — PROCEDURE
[FreeTextEntry3] : Euflexxa (Large Joint) with Ultrasound Guidance Viscosupplementation Injection: X-ray evidence of Osteoarthritis on this or prior visit and Patient has tried OTC's including aspirin, Ibuprofen, Aleve etc or prescription NSAIDS, and/or exercises at home and/ or physical therapy without satisfactory response.  An injection of Euflexxa 2ml _#_3__ was injected into the left shoulder(s). after verbal consent using sterile technique. The risks, benefits, and alternatives to Viscosupplementation injection were explained in full to the patient. Risks outlined include but are not limited to infection, sepsis, bleeding, scarring, skin discoloration, temporary increase in pain, syncopal episode, failure to resolve symptoms, allergic reaction, and symptom recurrence. Signs and symptoms of infection reviewed and patient advised to call immediately for redness, fevers, and/or chills. Patient understood the risks. All questions were answered. After discussion of options, patient requested Viscosupplementation. Oral informed consent was obtained and sterile prep was done of the injection site. Sterile technique was used without complications. The patient tolerated the procedure well. Ice tonight to the injection site.   Ultrasound Guidance was used for the following reasons: altered anatomic landmarks because of erosive arthritis.   Ultrasound guided injection was performed of the shoulder visualization of the needle and placement of injection was performed without complication.

## 2023-12-19 NOTE — HISTORY OF PRESENT ILLNESS
[3] : 3 [Euflexxa] : Euflexxa [Gradual] : gradual [10] : 10 [9] : 9 [Dull/Aching] : dull/aching [Throbbing] : throbbing [Constant] : constant [Rest] : rest [Meds] : meds [de-identified] : L shoulder sore and has lymphedema, meds did not help; here today to continue euflexxa series LT shoulder for oA [] : no [FreeTextEntry1] : right knee  [FreeTextEntry5] : patient has pain spasms on side of knee. [FreeTextEntry6] : spasms [FreeTextEntry9] : tylenol  [de-identified] : 5/23/2023 [de-identified] : left shoulder  [de-identified] : visco-3

## 2023-12-19 NOTE — PHYSICAL EXAM
[Left] : left shoulder [5___] : external rotation 5[unfilled]/5 [] : no ecchymosis [FreeTextEntry3] : lymphedema [TWNoteComboBox7] : active forward flexion 120 degrees

## 2023-12-19 NOTE — DISCUSSION/SUMMARY
[de-identified] : euflexxa #3 L shoulder tolerated well modify activities try OTC meds ice as needed try topical lidocaine discussed role and timing of repeat visco series

## 2024-03-13 ENCOUNTER — APPOINTMENT (OUTPATIENT)
Dept: VASCULAR SURGERY | Facility: CLINIC | Age: 64
End: 2024-03-13
Payer: MEDICARE

## 2024-03-13 DIAGNOSIS — I87.2 VENOUS INSUFFICIENCY (CHRONIC) (PERIPHERAL): ICD-10-CM

## 2024-03-13 PROCEDURE — 93971 EXTREMITY STUDY: CPT | Mod: LT

## 2024-03-13 PROCEDURE — 99213 OFFICE O/P EST LOW 20 MIN: CPT

## 2024-03-13 NOTE — ASSESSMENT
[FreeTextEntry1] : Problem #1 chronic venous insufficiency clinically stable asymptomatic continue leg elevation at rest follow up as needed  Problem #2 left upper extremity lymphedema clinically stable  continue compression and elevation as tolerated follow up as needed

## 2024-03-13 NOTE — PHYSICAL EXAM
[Respiratory Effort] : normal respiratory effort [Normal Rate and Rhythm] : normal rate and rhythm [2+] : left 2+ [Ankle Swelling (On Exam)] : not present [Varicose Veins Of Lower Extremities] : bilaterally [Ankle Swelling On The Right] : mild [] : not present [Abdomen Tenderness] : ~T ~M No abdominal tenderness [Skin Ulcer] : no ulcer [Alert] : alert [Oriented to Person] : oriented to person [Oriented to Place] : oriented to place [Oriented to Time] : oriented to time [Calm] : calm [de-identified] : appears stated age [de-identified] : normocephalic, atraumatic [de-identified] : supple [FreeTextEntry1] : left upper extremity lymphedema present

## 2024-03-13 NOTE — HISTORY OF PRESENT ILLNESS
[FreeTextEntry1] : 62 year old female who presents for evaluation of left upper extremity lymphedema s/p bilateral mastectomy for breast cancer. She previously underwent left upper extremity venoplasty as well. She recently started physical therapy and has seen some improvement in her left arm including her left wrist contour. She denies pain in either arm. She uses a lymphedema pump every night.  06/28/23 - Presents for re-evaluation after she was told she has increased risk of LLE DVT with hip surgery She is undergoing evaluation for left hip replacement She denies any history of swelling in either leg.  She denies claudication or rest pain. No personal for family history of bleeding or clotting disorders. [de-identified] : Denies any interval changes. Denies left leg swelling but wants to have interval evaluation of left leg since her hip surgery was performed.

## 2024-03-22 ENCOUNTER — APPOINTMENT (OUTPATIENT)
Dept: ORTHOPEDIC SURGERY | Facility: CLINIC | Age: 64
End: 2024-03-22
Payer: MEDICARE

## 2024-03-22 VITALS — WEIGHT: 225 LBS | BODY MASS INDEX: 41.41 KG/M2 | HEIGHT: 62 IN

## 2024-03-22 DIAGNOSIS — Z96.642 PRESENCE OF LEFT ARTIFICIAL HIP JOINT: ICD-10-CM

## 2024-03-22 PROCEDURE — 73502 X-RAY EXAM HIP UNI 2-3 VIEWS: CPT

## 2024-03-22 PROCEDURE — 99213 OFFICE O/P EST LOW 20 MIN: CPT

## 2024-03-22 NOTE — HISTORY OF PRESENT ILLNESS
[10] : 10 [5] : 5 [Dull/Aching] : dull/aching [Sharp] : sharp [Constant] : constant [Household chores] : household chores [Leisure] : leisure [Work] : work [Sleep] : sleep [Meds] : meds [Heat] : heat [Walking] : walking [Part time] : Work status: part time [de-identified] : 6/26/23: 64 yo f here for the evaluation of her left hip;  Pain to the left groin worsening since 2/2023 after she went on a cruise. Pain worse with stairs and steps. Unable to tie her shoes and is having difficulty bending. There is difficulty sleeping. The weather effects the pain. She has been taking arthritis medications OTC and rapid release tylenol.   Pmhx: Rou-en-y procedure; Breast cancer hx with double mastectomy with maximum chemo/radiation; DM HGA1C? Lympedema  8/7/23: 1st post op s/p L hip. Doing well. Ambulating with a cane.   09/11/2023: 2nd PO s/p L hip. Pain has improved since last office visit. No pain in hip, complaining of low back pain mostly/ 11/20/23 4 mo s/p L JONNA, doing well, minimal pain, walking unassisted today  3/22/24: 8mon s/p L JONNA. complaining of bilateral buttocks and low back pain today [] : no [FreeTextEntry5] : pt is here today for follow up on lt hip. pt states she been having pain still within the hip. she saw  and he prescribed her ''tizidine 4mg'' [FreeTextEntry1] : Left hip [FreeTextEntry7] : sometimes to her toes [de-identified] : xrays [de-identified] : movement

## 2024-03-22 NOTE — ASSESSMENT
[FreeTextEntry1] : 63F  8mos s/p L JONNA, lumbar DDD  Continue HEP Progress activities as tolerated  f/u Dr Lynne re L spine return 1 year post op  We discussed the patient's progress. The patient was reminded of their hip dislocation precautions and their antibiotic prophylaxis. We discussed continued physical therapy and/or a home exercise program. Questions about their hip replacement and future follow up were answered and discussed.

## 2024-04-04 ENCOUNTER — APPOINTMENT (OUTPATIENT)
Dept: ORTHOPEDIC SURGERY | Facility: CLINIC | Age: 64
End: 2024-04-04
Payer: MEDICARE

## 2024-04-04 DIAGNOSIS — M43.16 SPONDYLOLISTHESIS, LUMBAR REGION: ICD-10-CM

## 2024-04-04 PROCEDURE — 99213 OFFICE O/P EST LOW 20 MIN: CPT

## 2024-04-04 NOTE — IMAGING
[No bony abnormalities] : No bony abnormalities [Facet arthropathy] : Facet arthropathy [Disc space narrowing] : Disc space narrowing [No spinal deformity, fracture, lytic lesion, or marked single level collapse] : No spinal deformity, fracture, lytic lesion, or marked single level collapse [No instability seen on flexion/extension] : No instability seen on flexion/extension [de-identified] : LSPINE Inspection: no defects, deformity Palpation: Midline mild tenderness. No spasm in bilateral and lumbar paraspinal musculature ROM: Full with some stiffness Motor: no focal deficit  Strength: 5/5 bilateral hip flexors, knee extensors, ankle dorsiflexors, EHL, ankle plantarflexors Sensation I LT  - SLR B/L  Toe and heal walking intact  Gait non antalgic, non myelopathic

## 2024-04-04 NOTE — HISTORY OF PRESENT ILLNESS
[5] : 5 [Dull/Aching] : dull/aching [Sharp] : sharp [Throbbing] : throbbing [Constant] : constant [Rest] : rest [Meds] : meds [Heat] : heat [Walking] : walking [Lying in bed] : lying in bed [de-identified] : 04/04/24: Pt with lumbar spondylosis. Mainly complains of difficulty sleeping. Having sig low back pain- does not radiate down LE. However also reports LT hip pain- she is 8 months s/p L JONNA- seeing Dr. Gutierrez for this. Ambulates with cane.   12/4/23: here for f/u. States her symptoms have improved. Stopped PT bc it was aggravating her symptoms. heat and nsaids help.   09/23/2023: This is a 63 year F with c/o of lower back. Started years ago. Was seen in the past for her back, suggested addressing hip, had L JONNA in July. Reports lately back has been getting worse. No pain radiating down LE. 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  [FreeTextEntry5] : Follow Up lower back. Has been in severe pain for the past 2 weeks. Right sided pain. Had Left hip replacement 07/2023 w/Brenda.

## 2024-04-04 NOTE — ASSESSMENT
[FreeTextEntry1] : 62 yo F with lumbar spondylosis. Symptoms have mostly resolved. Stopped the PT as it was aggravating symptoms. continue observation HEP and heat prn. defer mri. f/u prn  04/04/24: No sig change. She is well informed and will obtain L-spine MRI to eval HNP as her symptoms have persisted. No sig improvement with PT in the past. Given a few hydrocodone to help sleep. Follow up after MRI.

## 2024-04-13 NOTE — OCCUPATIONAL THERAPY INITIAL EVALUATION ADULT - SITTING BALANCE: DYNAMIC
good balance 54 year old male, past medical history alcohol use, bib ems w/ intox. patient was found lying on ground next to several bottles of alcohol, admits to drinking today. denies other complaints. denies drug use.

## 2024-05-04 ENCOUNTER — APPOINTMENT (OUTPATIENT)
Dept: MRI IMAGING | Facility: CLINIC | Age: 64
End: 2024-05-04
Payer: MEDICARE

## 2024-05-04 PROCEDURE — 72148 MRI LUMBAR SPINE W/O DYE: CPT | Mod: MH

## 2024-05-15 ENCOUNTER — APPOINTMENT (OUTPATIENT)
Dept: VASCULAR SURGERY | Facility: CLINIC | Age: 64
End: 2024-05-15
Payer: MEDICARE

## 2024-05-15 VITALS
WEIGHT: 222 LBS | BODY MASS INDEX: 40.85 KG/M2 | SYSTOLIC BLOOD PRESSURE: 130 MMHG | DIASTOLIC BLOOD PRESSURE: 80 MMHG | HEART RATE: 72 BPM | TEMPERATURE: 98.6 F | HEIGHT: 62 IN

## 2024-05-15 DIAGNOSIS — I89.0 LYMPHEDEMA, NOT ELSEWHERE CLASSIFIED: ICD-10-CM

## 2024-05-15 PROCEDURE — 99213 OFFICE O/P EST LOW 20 MIN: CPT

## 2024-05-19 PROBLEM — I89.0 LYMPHEDEMA: Status: ACTIVE | Noted: 2020-08-07

## 2024-05-19 NOTE — HISTORY OF PRESENT ILLNESS
[FreeTextEntry1] : 62 year old female who presents for evaluation of left upper extremity lymphedema s/p bilateral mastectomy for breast cancer. She previously underwent left upper extremity venoplasty as well. She recently started physical therapy and has seen some improvement in her left arm including her left wrist contour. She denies pain in either arm. She uses a lymphedema pump every night.  06/28/23 - Presents for re-evaluation after she was told she has increased risk of LLE DVT with hip surgery She is undergoing evaluation for left hip replacement She denies any history of swelling in either leg.  She denies claudication or rest pain. No personal for family history of bleeding or clotting disorders.  03/13/24 - Denies any interval changes. Denies left leg swelling but wants to have interval evaluation of left leg since her hip surgery was performed. [de-identified] : Returns to explore any other options that may be available to her from surgical standpoint for left arm lymphedema.  She otherwise denies any changes.

## 2024-05-19 NOTE — PHYSICAL EXAM
[Respiratory Effort] : normal respiratory effort [Normal Rate and Rhythm] : normal rate and rhythm [2+] : left 2+ [Ankle Swelling (On Exam)] : not present [Ankle Swelling On The Right] : mild [Varicose Veins Of Lower Extremities] : bilaterally [] : not present [Abdomen Tenderness] : ~T ~M No abdominal tenderness [Skin Ulcer] : no ulcer [Oriented to Person] : oriented to person [Alert] : alert [Oriented to Place] : oriented to place [Oriented to Time] : oriented to time [Calm] : calm [de-identified] : normocephalic, atraumatic [de-identified] : appears stated age [de-identified] : supple [FreeTextEntry1] : left upper extremity lymphedema present

## 2024-05-19 NOTE — ASSESSMENT
[FreeTextEntry1] : Problem #1 left arm lymphedema primary symptom is heaviness of the arm will refer to plastic surgeon Dr. Wallace Yin MD, discussed patient with him he will further evaluate for any surgical options follow up as needed

## 2024-05-23 ENCOUNTER — APPOINTMENT (OUTPATIENT)
Dept: ORTHOPEDIC SURGERY | Facility: CLINIC | Age: 64
End: 2024-05-23
Payer: MEDICARE

## 2024-05-23 DIAGNOSIS — G89.29 RADICULOPATHY, LUMBAR REGION: ICD-10-CM

## 2024-05-23 DIAGNOSIS — M51.9 UNSPECIFIED THORACIC, THORACOLUMBAR AND LUMBOSACRAL INTERVERTEBRAL DISC DISORDER: ICD-10-CM

## 2024-05-23 DIAGNOSIS — M54.16 RADICULOPATHY, LUMBAR REGION: ICD-10-CM

## 2024-05-23 PROCEDURE — 99213 OFFICE O/P EST LOW 20 MIN: CPT

## 2024-05-23 NOTE — ASSESSMENT
[FreeTextEntry1] : 64 yo F with lumbar spondylosis. 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   - Resume PT and focus on strengthening core.

## 2024-05-23 NOTE — DATA REVIEWED
[MRI] : MRI [Lumbar Spine] : lumbar spine [Report was reviewed and noted in the chart] : The report was reviewed and noted in the chart [I independently reviewed and interpreted images and report] : I independently reviewed and interpreted images and report [FreeTextEntry1] : [facility] MRI 5/4/24 1. L2-3 posterior HNP asymmetric on the R impinging and displacing laterally the L2 and severe R foraminal stenosis. Moderate left foraminal stenosis.  2. No acute fx or malalignment  3. Paraspinal muscle atrophy

## 2024-05-23 NOTE — HISTORY OF PRESENT ILLNESS
[5] : 5 [Dull/Aching] : dull/aching [Sharp] : sharp [Throbbing] : throbbing [Constant] : constant [Rest] : rest [Meds] : meds [Heat] : heat [Walking] : walking [Lying in bed] : lying in bed [de-identified] : 5/23/24: Follow up [facility]. MRI review.   04/04/24: Pt with lumbar spondylosis. Mainly complains of difficulty sleeping. Having sig low back pain- does not radiate down LE. However also reports LT hip pain- she is 8 months s/p L JONNA- seeing Dr. Gutierrez for this. Ambulates with cane.   12/4/23: here for f/u. States her symptoms have improved. Stopped PT bc it was aggravating her symptoms. heat and nsaids help.   09/23/2023: This is a 63 year F with c/o of lower back. Started years ago. Was seen in the past for her back, suggested addressing hip, had L JONNA in July. Reports lately back has been getting worse. No pain radiating down LE. 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  [FreeTextEntry5] : MRI REVIEW [facility]

## 2024-05-23 NOTE — IMAGING
[No bony abnormalities] : No bony abnormalities [Facet arthropathy] : Facet arthropathy [Disc space narrowing] : Disc space narrowing [No spinal deformity, fracture, lytic lesion, or marked single level collapse] : No spinal deformity, fracture, lytic lesion, or marked single level collapse [No instability seen on flexion/extension] : No instability seen on flexion/extension [de-identified] : LSPINE Inspection: no defects, deformity Palpation: Midline mild tenderness. No spasm in bilateral and lumbar paraspinal musculature ROM: Full with some stiffness Motor: no focal deficit  Strength: 5/5 bilateral hip flexors, knee extensors, ankle dorsiflexors, EHL, ankle plantarflexors Sensation I LT  - SLR B/L  Toe and heal walking intact  Gait non antalgic, non myelopathic

## 2024-05-30 RX ORDER — HYDROCODONE BITARTRATE AND ACETAMINOPHEN 10; 325 MG/1; MG/1
10-325 TABLET ORAL
Qty: 30 | Refills: 0 | Status: ACTIVE | COMMUNITY
Start: 2024-04-04 | End: 1900-01-01

## 2024-06-20 ENCOUNTER — APPOINTMENT (OUTPATIENT)
Dept: ORTHOPEDIC SURGERY | Facility: CLINIC | Age: 64
End: 2024-06-20
Payer: MEDICARE

## 2024-06-20 VITALS — WEIGHT: 222 LBS | HEIGHT: 62 IN | BODY MASS INDEX: 40.85 KG/M2

## 2024-06-20 DIAGNOSIS — M17.11 UNILATERAL PRIMARY OSTEOARTHRITIS, RIGHT KNEE: ICD-10-CM

## 2024-06-20 DIAGNOSIS — M17.12 UNILATERAL PRIMARY OSTEOARTHRITIS, LEFT KNEE: ICD-10-CM

## 2024-06-20 PROCEDURE — 20611 DRAIN/INJ JOINT/BURSA W/US: CPT | Mod: RT

## 2024-06-20 PROCEDURE — 73564 X-RAY EXAM KNEE 4 OR MORE: CPT | Mod: 50

## 2024-06-20 PROCEDURE — 99213 OFFICE O/P EST LOW 20 MIN: CPT | Mod: 25

## 2024-06-20 NOTE — HISTORY OF PRESENT ILLNESS
[Gradual] : gradual [Dull/Aching] : dull/aching [Throbbing] : throbbing [Rest] : rest [Meds] : meds [Walking] : walking [Stairs] : stairs [de-identified] : has OA R knee and L one aches but not as bad, worse when steps up stairs and walks, no injury, no swelling [10] : 10 [9] : 9 [Constant] : constant [3] : 3 [Euflexxa] : Euflexxa [] : yes [FreeTextEntry1] : right knee  [FreeTextEntry5] : patient has pain spasms on side of knee. [FreeTextEntry6] : spasms [FreeTextEntry9] : tylenol  [de-identified] : 5/23/2023 [de-identified] : left shoulder  [de-identified] : visco-3

## 2024-06-20 NOTE — PHYSICAL EXAM
[FreeTextEntry3] : lymphedema [Bilateral] : knee bilaterally [5___] : quadriceps 5[unfilled]/5 [Negative] : negative Hayley's [] : mildly antalgic [Right] : right knee [All Views] : anteroposterior, lateral, skyline, and anteroposterior standing [Degenerative change] : Degenerative change [FreeTextEntry9] : R >> L [TWNoteComboBox7] : flexion 125 degrees

## 2024-06-20 NOTE — DISCUSSION/SUMMARY
[de-identified] : modify activities try OTC meds ice as needed try topical lidocaine 06/20/2024  RE:  MARY RANDOLPH   Acct #- [id number]  Attention:  Nurse Reviewer /Medical Director  I am writing this letter as a medical necessity for HA euflexxa R knee Patient has tried analgesics, non-steroid anti-inflammatory agents,  physical therapy, hot or cold compresses,injections of corticosteroids, etc)  which in combination or by themselves has not worked. Based on my patient's condition, I strongly believe that the Hyaluronic aid injections is medically needed.   Thank you for your time and consideration.

## 2024-06-27 ENCOUNTER — APPOINTMENT (OUTPATIENT)
Dept: ORTHOPEDIC SURGERY | Facility: CLINIC | Age: 64
End: 2024-06-27
Payer: MEDICARE

## 2024-06-27 DIAGNOSIS — M19.012 PRIMARY OSTEOARTHRITIS, LEFT SHOULDER: ICD-10-CM

## 2024-06-27 DIAGNOSIS — M17.11 UNILATERAL PRIMARY OSTEOARTHRITIS, RIGHT KNEE: ICD-10-CM

## 2024-06-27 PROCEDURE — 20611 DRAIN/INJ JOINT/BURSA W/US: CPT | Mod: 59,LT

## 2024-06-27 PROCEDURE — 99213 OFFICE O/P EST LOW 20 MIN: CPT | Mod: 25

## 2024-07-03 ENCOUNTER — APPOINTMENT (OUTPATIENT)
Dept: ORTHOPEDIC SURGERY | Facility: CLINIC | Age: 64
End: 2024-07-03
Payer: MEDICARE

## 2024-07-03 DIAGNOSIS — M17.12 UNILATERAL PRIMARY OSTEOARTHRITIS, LEFT KNEE: ICD-10-CM

## 2024-07-03 PROCEDURE — 20611 DRAIN/INJ JOINT/BURSA W/US: CPT | Mod: 50

## 2024-07-11 ENCOUNTER — APPOINTMENT (OUTPATIENT)
Dept: ORTHOPEDIC SURGERY | Facility: CLINIC | Age: 64
End: 2024-07-11
Payer: MEDICARE

## 2024-07-11 VITALS — HEIGHT: 62 IN | BODY MASS INDEX: 40.85 KG/M2 | WEIGHT: 222 LBS

## 2024-07-11 DIAGNOSIS — M19.012 PRIMARY OSTEOARTHRITIS, LEFT SHOULDER: ICD-10-CM

## 2024-07-11 PROCEDURE — 20611 DRAIN/INJ JOINT/BURSA W/US: CPT | Mod: LT

## 2024-07-30 ENCOUNTER — APPOINTMENT (OUTPATIENT)
Dept: ORTHOPEDIC SURGERY | Facility: CLINIC | Age: 64
End: 2024-07-30
Payer: MEDICARE

## 2024-07-30 DIAGNOSIS — M43.16 SPONDYLOLISTHESIS, LUMBAR REGION: ICD-10-CM

## 2024-07-30 DIAGNOSIS — M51.9 UNSPECIFIED THORACIC, THORACOLUMBAR AND LUMBOSACRAL INTERVERTEBRAL DISC DISORDER: ICD-10-CM

## 2024-07-30 PROCEDURE — 99213 OFFICE O/P EST LOW 20 MIN: CPT

## 2024-07-30 NOTE — IMAGING
[No bony abnormalities] : No bony abnormalities [Facet arthropathy] : Facet arthropathy [Disc space narrowing] : Disc space narrowing [No spinal deformity, fracture, lytic lesion, or marked single level collapse] : No spinal deformity, fracture, lytic lesion, or marked single level collapse [No instability seen on flexion/extension] : No instability seen on flexion/extension [de-identified] : LSPINE Inspection: no defects, deformity Palpation: Midline mild tenderness. No spasm in bilateral and lumbar paraspinal musculature ROM: Full with some stiffness Motor: no focal deficit  Strength: 5/5 bilateral hip flexors, knee extensors, ankle dorsiflexors, EHL, ankle plantarflexors Sensation I LT  - SLR B/L  Toe and heal walking intact  Gait non antalgic, non myelopathic

## 2024-07-30 NOTE — HISTORY OF PRESENT ILLNESS
[5] : 5 [Dull/Aching] : dull/aching [Sharp] : sharp [Throbbing] : throbbing [Constant] : constant [Rest] : rest [Meds] : meds [Heat] : heat [Walking] : walking [Lying in bed] : lying in bed [de-identified] : 7/30/24 here for fu, reports symptoms are improving with PT. Taking medications prn.   5/23/24: Follow up [facility]. MRI review.   04/04/24: Pt with lumbar spondylosis. Mainly complains of difficulty sleeping. Having sig low back pain- does not radiate down LE. However also reports LT hip pain- she is 8 months s/p L JONNA- seeing Dr. Gutierrez for this. Ambulates with cane.   12/4/23: here for f/u. States her symptoms have improved. Stopped PT bc it was aggravating her symptoms. heat and nsaids help.   09/23/2023: This is a 63 year F with c/o of lower back. Started years ago. Was seen in the past for her back, suggested addressing hip, had L JONNA in July. Reports lately back has been getting worse. No pain radiating down LE. 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  [FreeTextEntry5] : Follow Up [facility]. PT helps; needs new script.

## 2024-07-30 NOTE — IMAGING
[No bony abnormalities] : No bony abnormalities [Facet arthropathy] : Facet arthropathy [Disc space narrowing] : Disc space narrowing [No spinal deformity, fracture, lytic lesion, or marked single level collapse] : No spinal deformity, fracture, lytic lesion, or marked single level collapse [No instability seen on flexion/extension] : No instability seen on flexion/extension [de-identified] : LSPINE Inspection: no defects, deformity Palpation: Midline mild tenderness. No spasm in bilateral and lumbar paraspinal musculature ROM: Full with some stiffness Motor: no focal deficit  Strength: 5/5 bilateral hip flexors, knee extensors, ankle dorsiflexors, EHL, ankle plantarflexors Sensation I LT  - SLR B/L  Toe and heal walking intact  Gait non antalgic, non myelopathic

## 2024-07-30 NOTE — HISTORY OF PRESENT ILLNESS
[5] : 5 [Dull/Aching] : dull/aching [Sharp] : sharp [Throbbing] : throbbing [Constant] : constant [Rest] : rest [Meds] : meds [Heat] : heat [Walking] : walking [Lying in bed] : lying in bed [de-identified] : 7/30/24 here for fu, reports symptoms are improving with PT. Taking medications prn.   5/23/24: Follow up [facility]. MRI review.   04/04/24: Pt with lumbar spondylosis. Mainly complains of difficulty sleeping. Having sig low back pain- does not radiate down LE. However also reports LT hip pain- she is 8 months s/p L JONNA- seeing Dr. Gutierrez for this. Ambulates with cane.   12/4/23: here for f/u. States her symptoms have improved. Stopped PT bc it was aggravating her symptoms. heat and nsaids help.   09/23/2023: This is a 63 year F with c/o of lower back. Started years ago. Was seen in the past for her back, suggested addressing hip, had L JONNA in July. Reports lately back has been getting worse. No pain radiating down LE. 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  [FreeTextEntry5] : Follow Up [facility]. PT helps; needs new script.

## 2024-07-30 NOTE — ASSESSMENT
[FreeTextEntry1] : 62 yo F with lumbar spondylosis. 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   - Resume PT and focus on strengthening core. Will give new RX today - Continues hydrocodone prn, will renew today - FU in 6-8 weeks prn   Patient seen by DIEGO Villarreal under the direct supervision of Dr. Ta Lynne

## 2024-09-24 ENCOUNTER — APPOINTMENT (OUTPATIENT)
Dept: ORTHOPEDIC SURGERY | Facility: CLINIC | Age: 64
End: 2024-09-24
Payer: MEDICARE

## 2024-09-24 DIAGNOSIS — M51.36 OTHER INTERVERTEBRAL DISC DEGENERATION, LUMBAR REGION: ICD-10-CM

## 2024-09-24 DIAGNOSIS — G89.29 RADICULOPATHY, LUMBAR REGION: ICD-10-CM

## 2024-09-24 DIAGNOSIS — G89.29 LOW BACK PAIN, UNSPECIFIED: ICD-10-CM

## 2024-09-24 DIAGNOSIS — M54.16 RADICULOPATHY, LUMBAR REGION: ICD-10-CM

## 2024-09-24 DIAGNOSIS — M54.50 LOW BACK PAIN, UNSPECIFIED: ICD-10-CM

## 2024-09-24 DIAGNOSIS — M51.9 UNSPECIFIED THORACIC, THORACOLUMBAR AND LUMBOSACRAL INTERVERTEBRAL DISC DISORDER: ICD-10-CM

## 2024-09-24 PROCEDURE — 99213 OFFICE O/P EST LOW 20 MIN: CPT

## 2024-09-24 NOTE — HISTORY OF PRESENT ILLNESS
[5] : 5 [Dull/Aching] : dull/aching [Sharp] : sharp [Throbbing] : throbbing [Constant] : constant [Rest] : rest [Meds] : meds [Heat] : heat [Walking] : walking [Lying in bed] : lying in bed [de-identified] :  09/24/2024: She has noted improvement with the physical therapy and she continues to take the dual action Aleve for back pain which helps also.  She notes she would like to stop taking the hydrocodone.  7/30/24 here for fu, reports symptoms are improving with PT. Taking medications prn.   5/23/24: Follow up [facility]. MRI review.   04/04/24: Pt with lumbar spondylosis. Mainly complains of difficulty sleeping. Having sig low back pain- does not radiate down LE. However also reports LT hip pain- she is 8 months s/p L JONNA- seeing Dr. Gutierrez for this. Ambulates with cane.   12/4/23: here for f/u. States her symptoms have improved. Stopped PT bc it was aggravating her symptoms. heat and nsaids help.   09/23/2023: This is a 63 year F with c/o of lower back. Started years ago. Was seen in the past for her back, suggested addressing hip, had L JONNA in July. Reports lately back has been getting worse. No pain radiating down LE. 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  [FreeTextEntry5] : Follow Up [facility]. Returned to work. PT has been helping.

## 2024-09-24 NOTE — ASSESSMENT
[FreeTextEntry1] : 62 yo F with lumbar spondylosis. 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   -  PT to continue  and focus on strengthening core. - She wants to d/c the hydrocodone  - FU in 6-8 weeks prn   Patient seen by DIEGO Gold under the direct supervision of Dr. Ta Lynne

## 2024-09-24 NOTE — IMAGING
[No bony abnormalities] : No bony abnormalities [Facet arthropathy] : Facet arthropathy [Disc space narrowing] : Disc space narrowing [No spinal deformity, fracture, lytic lesion, or marked single level collapse] : No spinal deformity, fracture, lytic lesion, or marked single level collapse [No instability seen on flexion/extension] : No instability seen on flexion/extension [de-identified] : LSPINE Inspection: no defects, deformity Palpation: Midline mild tenderness. No spasm in bilateral and lumbar paraspinal musculature ROM: Full with some stiffness Motor: no focal deficit  Strength: 5/5 bilateral hip flexors, knee extensors, ankle dorsiflexors, EHL, ankle plantarflexors Sensation I LT  - SLR B/L  Toe and heal walking intact  Gait non antalgic, non myelopathic

## 2024-11-05 ENCOUNTER — APPOINTMENT (OUTPATIENT)
Dept: ORTHOPEDIC SURGERY | Facility: CLINIC | Age: 64
End: 2024-11-05

## 2025-01-14 ENCOUNTER — APPOINTMENT (OUTPATIENT)
Dept: ORTHOPEDIC SURGERY | Facility: CLINIC | Age: 65
End: 2025-01-14
Payer: MEDICARE

## 2025-01-14 VITALS — WEIGHT: 222 LBS | BODY MASS INDEX: 40.85 KG/M2 | HEIGHT: 62 IN

## 2025-01-14 DIAGNOSIS — M17.11 UNILATERAL PRIMARY OSTEOARTHRITIS, RIGHT KNEE: ICD-10-CM

## 2025-01-14 PROCEDURE — 99213 OFFICE O/P EST LOW 20 MIN: CPT | Mod: 25

## 2025-01-14 PROCEDURE — 20611 DRAIN/INJ JOINT/BURSA W/US: CPT | Mod: RT

## 2025-01-21 ENCOUNTER — APPOINTMENT (OUTPATIENT)
Dept: ORTHOPEDIC SURGERY | Facility: CLINIC | Age: 65
End: 2025-01-21
Payer: MEDICARE

## 2025-01-21 DIAGNOSIS — M19.012 PRIMARY OSTEOARTHRITIS, LEFT SHOULDER: ICD-10-CM

## 2025-01-21 DIAGNOSIS — M17.11 UNILATERAL PRIMARY OSTEOARTHRITIS, RIGHT KNEE: ICD-10-CM

## 2025-01-21 PROCEDURE — 20611 DRAIN/INJ JOINT/BURSA W/US: CPT | Mod: 50

## 2025-01-28 ENCOUNTER — APPOINTMENT (OUTPATIENT)
Dept: ORTHOPEDIC SURGERY | Facility: CLINIC | Age: 65
End: 2025-01-28
Payer: MEDICARE

## 2025-01-28 DIAGNOSIS — M17.12 UNILATERAL PRIMARY OSTEOARTHRITIS, LEFT KNEE: ICD-10-CM

## 2025-01-28 DIAGNOSIS — M17.11 UNILATERAL PRIMARY OSTEOARTHRITIS, RIGHT KNEE: ICD-10-CM

## 2025-01-28 PROCEDURE — 20611 DRAIN/INJ JOINT/BURSA W/US: CPT | Mod: 50

## 2025-03-28 ENCOUNTER — APPOINTMENT (OUTPATIENT)
Dept: ORTHOPEDIC SURGERY | Facility: CLINIC | Age: 65
End: 2025-03-28
Payer: MEDICARE

## 2025-03-28 DIAGNOSIS — M51.9 UNSPECIFIED THORACIC, THORACOLUMBAR AND LUMBOSACRAL INTERVERTEBRAL DISC DISORDER: ICD-10-CM

## 2025-03-28 PROCEDURE — 73503 X-RAY EXAM HIP UNI 4/> VIEWS: CPT | Mod: LT

## 2025-03-28 PROCEDURE — 99214 OFFICE O/P EST MOD 30 MIN: CPT

## 2025-03-29 ENCOUNTER — LABORATORY RESULT (OUTPATIENT)
Age: 65
End: 2025-03-29

## 2025-04-04 ENCOUNTER — APPOINTMENT (OUTPATIENT)
Dept: ORTHOPEDIC SURGERY | Facility: CLINIC | Age: 65
End: 2025-04-04
Payer: MEDICARE

## 2025-04-04 DIAGNOSIS — Z96.642 PRESENCE OF LEFT ARTIFICIAL HIP JOINT: ICD-10-CM

## 2025-04-04 PROCEDURE — 99214 OFFICE O/P EST MOD 30 MIN: CPT

## 2025-04-29 ENCOUNTER — APPOINTMENT (OUTPATIENT)
Dept: PAIN MANAGEMENT | Facility: CLINIC | Age: 65
End: 2025-04-29
Payer: MEDICARE

## 2025-04-29 VITALS — BODY MASS INDEX: 42.14 KG/M2 | WEIGHT: 229 LBS | HEIGHT: 62 IN

## 2025-04-29 DIAGNOSIS — M51.9 UNSPECIFIED THORACIC, THORACOLUMBAR AND LUMBOSACRAL INTERVERTEBRAL DISC DISORDER: ICD-10-CM

## 2025-04-29 PROCEDURE — 99204 OFFICE O/P NEW MOD 45 MIN: CPT

## 2025-05-30 ENCOUNTER — APPOINTMENT (OUTPATIENT)
Age: 65
End: 2025-05-30
Payer: MEDICARE

## 2025-05-30 PROCEDURE — 64484 NJX AA&/STRD TFRM EPI L/S EA: CPT | Mod: LT,59

## 2025-05-30 PROCEDURE — 64483 NJX AA&/STRD TFRM EPI L/S 1: CPT | Mod: LT

## 2025-06-10 ENCOUNTER — APPOINTMENT (OUTPATIENT)
Dept: PAIN MANAGEMENT | Facility: CLINIC | Age: 65
End: 2025-06-10
Payer: MEDICARE

## 2025-06-10 VITALS — BODY MASS INDEX: 42.14 KG/M2 | WEIGHT: 229 LBS | HEIGHT: 62 IN

## 2025-06-10 PROCEDURE — 99214 OFFICE O/P EST MOD 30 MIN: CPT

## 2025-06-13 ENCOUNTER — APPOINTMENT (OUTPATIENT)
Dept: ORTHOPEDIC SURGERY | Facility: CLINIC | Age: 65
End: 2025-06-13
Payer: MEDICARE

## 2025-06-13 PROCEDURE — 99214 OFFICE O/P EST MOD 30 MIN: CPT

## 2025-08-11 ENCOUNTER — APPOINTMENT (OUTPATIENT)
Dept: ORTHOPEDIC SURGERY | Facility: CLINIC | Age: 65
End: 2025-08-11
Payer: MEDICARE

## 2025-08-11 VITALS — HEIGHT: 62 IN | BODY MASS INDEX: 42.14 KG/M2 | WEIGHT: 229 LBS

## 2025-08-11 DIAGNOSIS — M17.11 UNILATERAL PRIMARY OSTEOARTHRITIS, RIGHT KNEE: ICD-10-CM

## 2025-08-11 PROCEDURE — 73030 X-RAY EXAM OF SHOULDER: CPT | Mod: LT

## 2025-08-11 PROCEDURE — 99203 OFFICE O/P NEW LOW 30 MIN: CPT | Mod: 25

## 2025-08-11 PROCEDURE — 20611 DRAIN/INJ JOINT/BURSA W/US: CPT | Mod: 59,LT

## 2025-08-11 PROCEDURE — 99213 OFFICE O/P EST LOW 20 MIN: CPT | Mod: 25

## 2025-08-11 PROCEDURE — 73564 X-RAY EXAM KNEE 4 OR MORE: CPT | Mod: RT

## 2025-08-21 ENCOUNTER — APPOINTMENT (OUTPATIENT)
Dept: ORTHOPEDIC SURGERY | Facility: CLINIC | Age: 65
End: 2025-08-21
Payer: MEDICARE

## 2025-08-21 PROCEDURE — 20611 DRAIN/INJ JOINT/BURSA W/US: CPT | Mod: 50

## 2025-08-28 ENCOUNTER — APPOINTMENT (OUTPATIENT)
Dept: ORTHOPEDIC SURGERY | Facility: CLINIC | Age: 65
End: 2025-08-28
Payer: MEDICARE

## 2025-08-28 VITALS — WEIGHT: 229 LBS | BODY MASS INDEX: 42.14 KG/M2 | HEIGHT: 62 IN

## 2025-08-28 DIAGNOSIS — M19.012 PRIMARY OSTEOARTHRITIS, LEFT SHOULDER: ICD-10-CM

## 2025-08-28 DIAGNOSIS — M17.11 UNILATERAL PRIMARY OSTEOARTHRITIS, RIGHT KNEE: ICD-10-CM

## 2025-08-28 PROCEDURE — 20611 DRAIN/INJ JOINT/BURSA W/US: CPT | Mod: 59,LT

## (undated) DEVICE — DRAPE SHOWER CURTAIN ISOLATION

## (undated) DEVICE — SAW BLADE STRYKER SAGITTAL DUAL CUT 18X90X1.19MM

## (undated) DEVICE — GLV 7.5 PROTEXIS (WHITE)

## (undated) DEVICE — GLV 7.5 PROTEXIS ORTHO (BROWN)

## (undated) DEVICE — NDL HYPO SAFE 22G X 1.5" (BLACK)

## (undated) DEVICE — VENODYNE/SCD SLEEVE CALF MEDIUM

## (undated) DEVICE — ELCTR GROUNDING PAD ADULT COVIDIEN

## (undated) DEVICE — DRSG DERMABOND PRINEO 22CM

## (undated) DEVICE — SUT ETHIBOND 5 4-30" V-37

## (undated) DEVICE — SUT VICRYL 0 36" CT-1 UNDYED

## (undated) DEVICE — PREP CHLORAPREP HI-LITE ORANGE 26ML

## (undated) DEVICE — SUT STRATAFIX SYMMETRIC PDS PLUS 1 18" CTX VIOLET

## (undated) DEVICE — Device

## (undated) DEVICE — DRAPE TOWEL BLUE 17" X 24"

## (undated) DEVICE — DRSG WEBRIL 6"

## (undated) DEVICE — WARMING BLANKET UPPER ADULT

## (undated) DEVICE — HOOD T5 PEELAWAY

## (undated) DEVICE — SYR LUER LOK 20CC

## (undated) DEVICE — FRA-ESU BOVIE FORCE TRIAD T7J19745DX: Type: DURABLE MEDICAL EQUIPMENT

## (undated) DEVICE — SUT VICRYL 2-0 27" CT-1 UNDYED

## (undated) DEVICE — SOL IRR BAG NS 0.9% 3000ML

## (undated) DEVICE — TAPE SILK 1"

## (undated) DEVICE — GLV 8 PROTEXIS (BLUE)

## (undated) DEVICE — ZIMMER PULSAVAC PLUS FAN KIT

## (undated) DEVICE — PACK TOTAL JOINT

## (undated) DEVICE — PACK BASIC

## (undated) DEVICE — DRAPE 3/4 SHEET W REINFORCEMENT 56X77"

## (undated) DEVICE — DRAPE IOBAN 33" X 23"

## (undated) DEVICE — GOWN XL

## (undated) DEVICE — PREP SCRUB BRUSH W CHG 4%

## (undated) DEVICE — SUT MONOCRYL 3-0 27" PS-2 UNDYED

## (undated) DEVICE — ELCTR AQUAMANTYS BIPOLAR SEALER 6.0